# Patient Record
Sex: FEMALE | Race: WHITE | NOT HISPANIC OR LATINO | Employment: UNEMPLOYED | URBAN - METROPOLITAN AREA
[De-identification: names, ages, dates, MRNs, and addresses within clinical notes are randomized per-mention and may not be internally consistent; named-entity substitution may affect disease eponyms.]

---

## 2018-03-12 ENCOUNTER — HOSPITAL ENCOUNTER (OUTPATIENT)
Dept: FAMILY MEDICINE CLINIC | Facility: CLINIC | Age: 58
Setting detail: SPECIMEN
Discharge: HOME | End: 2018-03-12
Attending: FAMILY MEDICINE | Admitting: FAMILY MEDICINE

## 2018-03-15 ENCOUNTER — HOSPITAL ENCOUNTER (OUTPATIENT)
Dept: FAMILY MEDICINE CLINIC | Facility: CLINIC | Age: 58
Setting detail: SPECIMEN
Discharge: HOME | End: 2018-03-15
Attending: FAMILY MEDICINE | Admitting: FAMILY MEDICINE

## 2019-10-01 ENCOUNTER — OFFICE VISIT (OUTPATIENT)
Dept: FAMILY MEDICINE CLINIC | Facility: CLINIC | Age: 59
End: 2019-10-01

## 2019-10-01 VITALS
HEART RATE: 74 BPM | OXYGEN SATURATION: 98 % | WEIGHT: 198 LBS | RESPIRATION RATE: 16 BRPM | SYSTOLIC BLOOD PRESSURE: 149 MMHG | DIASTOLIC BLOOD PRESSURE: 89 MMHG | BODY MASS INDEX: 35.08 KG/M2 | HEIGHT: 63 IN | TEMPERATURE: 97.3 F

## 2019-10-01 DIAGNOSIS — E03.9 HYPOTHYROIDISM, UNSPECIFIED TYPE: ICD-10-CM

## 2019-10-01 DIAGNOSIS — R53.83 FATIGUE, UNSPECIFIED TYPE: ICD-10-CM

## 2019-10-01 DIAGNOSIS — R10.32 LEFT LOWER QUADRANT ABDOMINAL PAIN: Primary | ICD-10-CM

## 2019-10-01 PROBLEM — R19.4 CHANGE IN BOWEL HABITS: Status: ACTIVE | Noted: 2018-03-12

## 2019-10-01 LAB
ALBUMIN SERPL-MCNC: 3.8 G/DL (ref 3.5–4.8)
ALBUMIN/GLOB SERPL: 1.1 G/DL (ref 1–1.7)
ALP SERPL-CCNC: 84 U/L (ref 32–91)
ALT SERPL W P-5'-P-CCNC: 29 U/L (ref 14–54)
ANION GAP SERPL CALCULATED.3IONS-SCNC: 15.6 MMOL/L (ref 5–15)
AST SERPL-CCNC: 24 U/L (ref 15–41)
BASOPHILS # BLD AUTO: 0 10*3/MM3 (ref 0–0.2)
BASOPHILS NFR BLD AUTO: 0.5 % (ref 0–1.5)
BILIRUB SERPL-MCNC: 0.3 MG/DL (ref 0.3–1.2)
BUN BLD-MCNC: 12 MG/DL (ref 8–20)
BUN/CREAT SERPL: 13.3 (ref 5.4–26.2)
CALCIUM SPEC-SCNC: 9.1 MG/DL (ref 8.9–10.3)
CHLORIDE SERPL-SCNC: 104 MMOL/L (ref 101–111)
CO2 SERPL-SCNC: 24 MMOL/L (ref 22–32)
CREAT BLD-MCNC: 0.9 MG/DL (ref 0.4–1)
DEPRECATED RDW RBC AUTO: 43.8 FL (ref 37–54)
EOSINOPHIL # BLD AUTO: 0.1 10*3/MM3 (ref 0–0.4)
EOSINOPHIL NFR BLD AUTO: 1.3 % (ref 0.3–6.2)
ERYTHROCYTE [DISTWIDTH] IN BLOOD BY AUTOMATED COUNT: 13.9 % (ref 12.3–15.4)
GFR SERPL CREATININE-BSD FRML MDRD: 64 ML/MIN/1.73
GLOBULIN UR ELPH-MCNC: 3.5 GM/DL (ref 2.5–3.8)
GLUCOSE BLD-MCNC: 94 MG/DL (ref 65–99)
HCT VFR BLD AUTO: 43.6 % (ref 34–46.6)
HGB BLD-MCNC: 14.7 G/DL (ref 12–15.9)
LYMPHOCYTES # BLD AUTO: 3.5 10*3/MM3 (ref 0.7–3.1)
LYMPHOCYTES NFR BLD AUTO: 32.5 % (ref 19.6–45.3)
MCH RBC QN AUTO: 30.1 PG (ref 26.6–33)
MCHC RBC AUTO-ENTMCNC: 33.6 G/DL (ref 31.5–35.7)
MCV RBC AUTO: 89.5 FL (ref 79–97)
MONOCYTES # BLD AUTO: 0.6 10*3/MM3 (ref 0.1–0.9)
MONOCYTES NFR BLD AUTO: 5.8 % (ref 5–12)
NEUTROPHILS # BLD AUTO: 6.4 10*3/MM3 (ref 1.7–7)
NEUTROPHILS NFR BLD AUTO: 59.9 % (ref 42.7–76)
NRBC BLD AUTO-RTO: 0.1 /100 WBC (ref 0–0.2)
PLATELET # BLD AUTO: 276 10*3/MM3 (ref 140–450)
PMV BLD AUTO: 7.5 FL (ref 6–12)
POTASSIUM BLD-SCNC: 3.6 MMOL/L (ref 3.6–5.1)
PROT SERPL-MCNC: 7.3 G/DL (ref 6.1–7.9)
RBC # BLD AUTO: 4.87 10*6/MM3 (ref 3.77–5.28)
SODIUM BLD-SCNC: 140 MMOL/L (ref 136–144)
TSH SERPL DL<=0.05 MIU/L-ACNC: 4.7 UIU/ML (ref 0.34–5.6)
WBC NRBC COR # BLD: 10.7 10*3/MM3 (ref 3.4–10.8)

## 2019-10-01 PROCEDURE — 85025 COMPLETE CBC W/AUTO DIFF WBC: CPT | Performed by: FAMILY MEDICINE

## 2019-10-01 PROCEDURE — 84443 ASSAY THYROID STIM HORMONE: CPT | Performed by: FAMILY MEDICINE

## 2019-10-01 PROCEDURE — 36415 COLL VENOUS BLD VENIPUNCTURE: CPT | Performed by: FAMILY MEDICINE

## 2019-10-01 PROCEDURE — 99214 OFFICE O/P EST MOD 30 MIN: CPT | Performed by: FAMILY MEDICINE

## 2019-10-01 PROCEDURE — 80053 COMPREHEN METABOLIC PANEL: CPT | Performed by: FAMILY MEDICINE

## 2019-10-01 NOTE — PROGRESS NOTES
Subjective   Chief Complaint   Patient presents with   • Rectal Bleeding     Smiley Sanchez is a 59 y.o. female.     She has been eating out at restaurants more frequently.  She had some pink tinge to her stool yesterday.  Mucus in stool. No diarrhea.  Paper-thin unformed stools. She thinks she had a hemorrhoid recently but she treated it by putting menthol in her belly button and it seems to have improved.  She stopped taking thyroid medicine years ago because she felt like it made her gain 40 lbs.       Rectal Bleeding   This is a new problem. The current episode started yesterday. The problem occurs intermittently. The problem has been gradually worsening. Associated symptoms include abdominal pain, a change in bowel habit and fatigue. Pertinent negatives include no anorexia, chest pain, chills, coughing, fever, nausea, rash, sore throat, swollen glands, urinary symptoms or vomiting. Nothing aggravates the symptoms.   Hypothyroidism   This is a chronic problem. The current episode started more than 1 year ago. The problem has been waxing and waning. Associated symptoms include abdominal pain, a change in bowel habit and fatigue. Pertinent negatives include no anorexia, chest pain, chills, coughing, fever, nausea, rash, sore throat, swollen glands, urinary symptoms or vomiting.      Past Medical History:   Diagnosis Date   • Hypothyroidism    • Vitamin D deficiency      Past Surgical History:   Procedure Laterality Date   • CHOLECYSTECTOMY     • KNEE ARTHROSCOPY       Allergies   Allergen Reactions   • Tetracycline Unknown (See Comments)     Social History     Socioeconomic History   • Marital status: Single     Spouse name: Not on file   • Number of children: Not on file   • Years of education: Not on file   • Highest education level: Not on file   Tobacco Use   • Smoking status: Never Smoker   • Smokeless tobacco: Never Used   Substance and Sexual Activity   • Sexual activity: No     Social History     Tobacco  "Use   Smoking Status Never Smoker   Smokeless Tobacco Never Used       family history includes Diabetes in her father and mother; Hypertension in her father and mother.  No current outpatient medications on file prior to visit.     No current facility-administered medications on file prior to visit.      Patient Active Problem List   Diagnosis   • Change in bowel habits   • Fatigue   • Hypothyroidism   • Left lower quadrant abdominal pain       The following portions of the patient's history were reviewed and updated as appropriate: allergies, current medications, past family history, past medical history, past social history, past surgical history and problem list.    Review of Systems   Constitutional: Positive for fatigue. Negative for chills and fever.   HENT: Negative for sinus pressure, sore throat and swollen glands.    Eyes: Negative for blurred vision.   Respiratory: Negative for cough and shortness of breath.    Cardiovascular: Negative for chest pain and palpitations.   Gastrointestinal: Positive for abdominal pain, change in bowel habit and hematochezia. Negative for anorexia, nausea and vomiting.   Endocrine: Negative for polyuria.   Skin: Negative for rash.   Neurological: Negative for dizziness and headache.   Hematological: Negative for adenopathy.   Psychiatric/Behavioral: Negative for depressed mood.       Objective   /89 (BP Location: Left arm, Patient Position: Sitting, Cuff Size: Adult)   Pulse 74   Temp 97.3 °F (36.3 °C) (Oral)   Resp 16   Ht 160 cm (63\")   Wt 89.8 kg (198 lb)   SpO2 98%   BMI 35.07 kg/m²   Physical Exam   Constitutional: She is oriented to person, place, and time. She appears well-developed. No distress.   HENT:   Head: Normocephalic.   Eyes: Conjunctivae and lids are normal.   Neck: Trachea normal. No thyroid mass and no thyromegaly present.   Cardiovascular: Normal rate, regular rhythm and normal heart sounds.   Pulmonary/Chest: Effort normal and breath sounds " normal.   Abdominal: Soft. Normal appearance. There is tenderness in the left lower quadrant. There is no rebound and no guarding.   Lymphadenopathy:     She has no cervical adenopathy.   Neurological: She is alert and oriented to person, place, and time.   Skin: Skin is warm and dry.   Psychiatric: She has a normal mood and affect. Her speech is normal and behavior is normal. She is attentive.       No visits with results within 1 Week(s) from this visit.   Latest known visit with results is:   No results found for any previous visit.           Assessment/Plan   Problems Addressed this Visit        Endocrine    Hypothyroidism     Off of her medicine for a long time.  Check labs.         Relevant Orders    Comprehensive Metabolic Panel    TSH       Nervous and Auditory    Left lower quadrant abdominal pain - Primary     Rule out diverticulitis.  Pt appears more tender on exam than expected. Check labs as ordered.         Relevant Orders    CT Abdomen Pelvis With Contrast    CBC & Differential    Comprehensive Metabolic Panel    CBC Auto Differential       Other    Fatigue          Smiley was seen today for rectal bleeding.    Diagnoses and all orders for this visit:    Left lower quadrant abdominal pain  -     CT Abdomen Pelvis With Contrast; Future  -     CBC & Differential  -     Comprehensive Metabolic Panel  -     CBC Auto Differential    Hypothyroidism, unspecified type  -     Comprehensive Metabolic Panel  -     TSH    Fatigue, unspecified type

## 2019-10-01 NOTE — PATIENT INSTRUCTIONS
Hypothyroidism    Hypothyroidism is when the thyroid gland does not make enough of certain hormones (it is underactive). The thyroid gland is a small gland located in the lower front part of the neck, just in front of the windpipe (trachea). This gland makes hormones that help control how the body uses food for energy (metabolism) as well as how the heart and brain function. These hormones also play a role in keeping your bones strong. When the thyroid is underactive, it produces too little of the hormones thyroxine (T4) and triiodothyronine (T3).  What are the causes?  This condition may be caused by:  · Hashimoto's disease. This is a disease in which the body's disease-fighting system (immune system) attacks the thyroid gland. This is the most common cause.  · Viral infections.  · Pregnancy.  · Certain medicines.  · Birth defects.  · Past radiation treatments to the head or neck for cancer.  · Past treatment with radioactive iodine.  · Past exposure to radiation in the environment.  · Past surgical removal of part or all of the thyroid.  · Problems with a gland in the center of the brain (pituitary gland).  · Lack of enough iodine in the diet.  What increases the risk?  You are more likely to develop this condition if:  · You are female.  · You have a family history of thyroid conditions.  · You use a medicine called lithium.  · You take medicines that affect the immune system (immunosuppressants).  What are the signs or symptoms?  Symptoms of this condition include:  · Feeling as though you have no energy (lethargy).  · Not being able to tolerate cold.  · Weight gain that is not explained by a change in diet or exercise habits.  · Lack of appetite.  · Dry skin.  · Coarse hair.  · Menstrual irregularity.  · Slowing of thought processes.  · Constipation.  · Sadness or depression.  How is this diagnosed?  This condition may be diagnosed based on:  · Your symptoms, your medical history, and a physical exam.  · Blood  tests.  You may also have imaging tests, such as an ultrasound or MRI.  How is this treated?  This condition is treated with medicine that replaces the thyroid hormones that your body does not make. After you begin treatment, it may take several weeks for symptoms to go away.  Follow these instructions at home:  · Take over-the-counter and prescription medicines only as told by your health care provider.  · If you start taking any new medicines, tell your health care provider.  · Keep all follow-up visits as told by your health care provider. This is important.  ? As your condition improves, your dosage of thyroid hormone medicine may change.  ? You will need to have blood tests regularly so that your health care provider can monitor your condition.  Contact a health care provider if:  · Your symptoms do not get better with treatment.  · You are taking thyroid replacement medicine and you:  ? Sweat a lot.  ? Have tremors.  ? Feel anxious.  ? Lose weight rapidly.  ? Cannot tolerate heat.  ? Have emotional swings.  ? Have diarrhea.  ? Feel weak.  Get help right away if you have:  · Chest pain.  · An irregular heartbeat.  · A rapid heartbeat.  · Difficulty breathing.  Summary  · Hypothyroidism is when the thyroid gland does not make enough of certain hormones (it is underactive).  · When the thyroid is underactive, it produces too little of the hormones thyroxine (T4) and triiodothyronine (T3).  · The most common cause is Hashimoto's disease, a disease in which the body's disease-fighting system (immune system) attacks the thyroid gland. The condition can also be caused by viral infections, medicine, pregnancy, or past radiation treatment to the head or neck.  · Symptoms may include weight gain, dry skin, constipation, feeling as though you do not have energy, and not being able to tolerate cold.  · This condition is treated with medicine to replace the thyroid hormones that your body does not make.  This information  is not intended to replace advice given to you by your health care provider. Make sure you discuss any questions you have with your health care provider.  Document Released: 12/18/2006 Document Revised: 11/28/2018 Document Reviewed: 11/28/2018  [employer] Interactive Patient Education © 2019 [employer]

## 2019-10-04 RX ORDER — AMOXICILLIN AND CLAVULANATE POTASSIUM 875; 125 MG/1; MG/1
1 TABLET, FILM COATED ORAL 2 TIMES DAILY
Qty: 20 TABLET | Refills: 0 | Status: SHIPPED | OUTPATIENT
Start: 2019-10-04 | End: 2022-07-25

## 2021-07-09 ENCOUNTER — HOSPITAL ENCOUNTER (EMERGENCY)
Facility: HOSPITAL | Age: 61
Discharge: HOME | End: 2021-07-09
Attending: EMERGENCY MEDICINE | Admitting: EMERGENCY MEDICINE

## 2021-07-09 VITALS
HEART RATE: 81 BPM | OXYGEN SATURATION: 99 % | SYSTOLIC BLOOD PRESSURE: 156 MMHG | HEIGHT: 63 IN | DIASTOLIC BLOOD PRESSURE: 88 MMHG | BODY MASS INDEX: 38.09 KG/M2 | TEMPERATURE: 97.9 F | WEIGHT: 215 LBS | RESPIRATION RATE: 18 BRPM

## 2021-07-09 DIAGNOSIS — Z77.29 CARBON MONOXIDE EXPOSURE: Primary | ICD-10-CM

## 2021-07-09 LAB — COHGB MFR BLD: 4.6 % (ref 0–3)

## 2021-07-09 PROCEDURE — 82375 ASSAY CARBOXYHB QUANT: CPT | Performed by: EMERGENCY MEDICINE

## 2021-07-09 PROCEDURE — 99283 EMERGENCY DEPT VISIT LOW MDM: CPT
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
## 2022-07-25 ENCOUNTER — OFFICE VISIT (OUTPATIENT)
Dept: FAMILY MEDICINE CLINIC | Facility: CLINIC | Age: 62
End: 2022-07-25

## 2022-07-25 VITALS
SYSTOLIC BLOOD PRESSURE: 132 MMHG | DIASTOLIC BLOOD PRESSURE: 89 MMHG | HEART RATE: 67 BPM | TEMPERATURE: 98.2 F | BODY MASS INDEX: 39.15 KG/M2 | OXYGEN SATURATION: 98 % | WEIGHT: 221 LBS

## 2022-07-25 DIAGNOSIS — M25.571 RIGHT ANKLE PAIN, UNSPECIFIED CHRONICITY: Primary | ICD-10-CM

## 2022-07-25 PROCEDURE — 99213 OFFICE O/P EST LOW 20 MIN: CPT | Performed by: FAMILY MEDICINE

## 2022-07-28 ENCOUNTER — TELEPHONE (OUTPATIENT)
Dept: FAMILY MEDICINE CLINIC | Facility: CLINIC | Age: 62
End: 2022-07-28

## 2022-07-28 NOTE — TELEPHONE ENCOUNTER
Patient returned call for xray results, results were given.  Patient stated understanding and will follow up with PT has ordered.     Thanks

## 2022-08-22 ENCOUNTER — TREATMENT (OUTPATIENT)
Dept: PHYSICAL THERAPY | Facility: CLINIC | Age: 62
End: 2022-08-22

## 2022-08-22 DIAGNOSIS — M25.571 RIGHT ANKLE PAIN, UNSPECIFIED CHRONICITY: Primary | ICD-10-CM

## 2022-08-22 PROCEDURE — 97161 PT EVAL LOW COMPLEX 20 MIN: CPT | Performed by: PHYSICAL THERAPIST

## 2022-08-22 NOTE — PROGRESS NOTES
Physical Therapy Initial Evaluation and Plan of Care    Patient: Smiley Sanchez   : 1960  Diagnosis/ICD-10 Code:  Right ankle pain, unspecified chronicity [M25.571]  Referring practitioner: Mamie Gongora MD  Date of Initial Visit: 2022  Today's Date: 2022  Patient seen for 1 sessions           Subjective Questionnaire:FAAM: 45/68 ( 4 omitted) 66% function.   Self reported function 75-85%      Subjective  Pt with onset of R posterolateral ankle pain, behind lateral malleolus insidious onset ~ 6-8 weeks ago. She reports sprained ankle about 5 years ago, healed no issues. She first noticed it walking into restaurant, sharp, burning, pulling. Caused her to limp. She had been doing treadmill with incline and elliptical at the gym. She had to decrease incline and now mostly just going elliptical but shorter duration. She notes worse when she initially stands up after sitting. Likes to write; sits with laptop on futon with legs extended on ottoman, often hours. Always painful,a  Lot of pulling initially when she gets up. Better with movement but then worse again if too much activity. Standing to cook meal aggravates, will often weight shift off R LE. Sometimes notices with going down stairs; especially desc very steep steps at [facility] when she went to see concert; had to modify to step to pattern. Able to do her ADLs and shopping; sometimes has pain, sometimes does not. She has been less active lately, tries to stay off her feet. She notes better with ice, rest, and leg press exercise at gym.  She goes barefoot at home, wears tennis shoes at gym. Flat feet. Pt reports mildly claustrophobic. She reports has gained weight, mostly muscle.    Saw PCP, xray negative. Follow up as needed.    Currently unemployed, previously employed in education    0/10 to 5/10    Objective          Observations     Additional Ankle/Foot Observation Details  Sit to stand unremarkable    Standing genu valgus, flat foot,  "R >L.    Ambulation: no significant antalgia, no AD. Mild scissoring. Navicular drop in stance bilat.    Calcaneal inversion R>L, forefoot varus (compensates with WB)            Palpation     Right Tenderness of the lateral gastrocnemius.     Tenderness     Right Ankle/Foot   Tenderness in the Achilles insertion and lateral malleolus.     Neurological Testing     Sensation     Ankle/Foot   Left Ankle/Foot   Intact: light touch    Right Ankle/Foot   Intact: light touch     Active Range of Motion     Additional Active Range of Motion Details  Seated hip ER: unremarkable bilat    Passive Range of Motion   Left Ankle/Foot    Inversion: 45 degrees   Eversion: 8 degrees     Right Ankle/Foot    Plantar flexion: WFL  Inversion: 42 degrees   Eversion: 10 degrees     Additional Passive Range of Motion Details  DF ROM:   R ke 0, kf 0 pulling calf  L ke -1, kf 4 pulling calf    AROM DF \"pulling\" posterior calf,  \"mild pulling\" lateral edd, PF and inv unremarkable    Joint Play     Right Ankle/Foot  Joints within functional limits are the forefoot. Hypomobile in the talocrural joint.     Strength/Myotome Testing     Additional Strength Details  Strength 4+/5 sitting throughout LE, all painfree except ankle. Mild discomfort with eversion. PF/DF, inv/ev all 4/5. Standing heel raise not tested this date.     Tests     Right Ankle/Foot   Negative for calcaneal squeeze and eversion talar tilt.      General Comments     Ankle/Foot Comments   Mild edema posterolateral R ankle behind lateral malleolus.    Single leg balance good ~ 10 sec bilat with navicular drop, no significant sway EO.         Assessment & Plan     Assessment  Impairments: abnormal gait, abnormal or restricted ROM, activity intolerance, impaired balance, impaired physical strength, lacks appropriate home exercise program, pain with function and weight-bearing intolerance  Functional Limitations: lifting, walking, pulling, pushing, uncomfortable because of pain, " "standing and stooping  Assessment details: Pt with mild ROM, flexibility, strength, and gait impairments. Mild edema and intermiitent pain. Foot structure and unsupportive shoewear also likely contribute; pain is reproduced in WB. She is able to do ADLs, but intermittent pain and modification especially shopping and cooking. She is not able to participate in gym exercise as she would like/did previously. She would benefit from skilled PT to address impairments and maximize function including return to PLOF.    Barriers to therapy: BMI 39.5. Pt requires frequent redirection to stay on task, pt reports flustered due to construction/car issues on drive to clinic  Prognosis: good    Goals  Plan Goals: ST.  Pt independent with HEP in 2 visits  2.  Pt to demonstrate ankle DF 9ke and kf) AROM to 5 degrees or better in 3 weeks  3. Decreased edema R ankle in 3 weeks  4. Pt to demonstrate AROM ankle ev 15 degrees painfree.  5. Pt to tolerate HEP exercise progression in 4 weeks  6. Pt to ambulate 50 ft without antalgic pattern and demonstrate improved push off with regular shoe and brace in 4 weeks   7. Pt to report pain decreased 50% or more with initial standing after sitting.    LTG  1. Pt to demonstrate R ankle 4=/5 or better all directions by D/C for squat, stairs.  2. Pt to demonstrate ankle DF 8 degrees or better (ke and kf)  By D/C needed for amb and desc stairs.  3. Pt to desc 8\" steps with recirprocal pattern by D/C  4. Pt to return to walking 1 mile with incline with proper shoewear and no increased R ankle symptoms by D/C  5. FAAm score improved to 90% or better for increased functional tolerance  6. Pt to stand 20 mins to cook meal without increased R ankle pain by D/C  7. Pt to voice readiness for D/C to independent HEP and self management of symptoms.     Plan  Therapy options: will be seen for skilled therapy services  Planned modality interventions: ultrasound, thermotherapy (hydrocollator packs), dry " needling, cryotherapy and electrical stimulation/Russian stimulation  Planned therapy interventions: balance/weight-bearing training, body mechanics training, flexibility, functional ROM exercises, gait training, home exercise program, joint mobilization, manual therapy, neuromuscular re-education, postural training, soft tissue mobilization, spinal/joint mobilization, strengthening, therapeutic activities and stretching  Frequency: 1x week  Duration in visits: 8  Duration in weeks: 12  Treatment plan discussed with: patient  Plan details: 1x/1-2 weeks          History # of Personal Factors and/or Comorbidities: MODERATE (1-2)  Examination of Body System(s): # of elements: LOW (1-2)  Clinical Presentation: STABLE   Clinical Decision Making: LOW     Timed:         Manual Therapy:         mins  33537;     Therapeutic Exercise:    15     mins  15659 (not charged)  Neuromuscular Ivan:        mins  58123;    Therapeutic Activity:          mins  17288;     Gait Training:           mins  45091;     Ultrasound:          mins  88824;    Ionto                                   mins   39539  Self Care                            mins   04604  Aquatic                               mins 76882      Un-Timed:  Electrical Stimulation:         mins  49279 ( );  Dry Needling          mins self-pay  Traction          mins 91995  Low Eval     35     Mins  98156  Mod Eval          Mins  94797  High Eval                            Mins  32132  Re-Eval                               mins  32207        Timed Treatment:   15   mins   Total Treatment:     50   mins    PT SIGNATURE: Gracia Lane PT   DATE TREATMENT INITIATED: 8/23/2022    Initial Certification  Certification Period: 11/21/2022  I certify that the therapy services are furnished while this patient is under my care.  The services outlined above are required by this patient, and will be reviewed every 90 days.     PHYSICIAN: Mamie Gongora MD      DATE:     Please sign  and return via fax to [phone].. Thank you, [facility].

## 2022-09-09 ENCOUNTER — TREATMENT (OUTPATIENT)
Dept: PHYSICAL THERAPY | Facility: CLINIC | Age: 62
End: 2022-09-09

## 2022-09-09 DIAGNOSIS — M25.571 RIGHT ANKLE PAIN, UNSPECIFIED CHRONICITY: Primary | ICD-10-CM

## 2022-09-09 PROCEDURE — 97140 MANUAL THERAPY 1/> REGIONS: CPT | Performed by: PHYSICAL THERAPIST

## 2022-09-09 PROCEDURE — 97110 THERAPEUTIC EXERCISES: CPT | Performed by: PHYSICAL THERAPIST

## 2022-09-09 NOTE — PROGRESS NOTES
Physical Therapy Treatment Note    VISIT#: 2     Diagnosis Plan   1. Right ankle pain, unspecified chronicity       Subjective   Smiley Sanchez reports: doing HEP and some yoga. Still hurts/pulls end range eversion and sometimes with circles otherwise. She has also been doing some gastroc stretches and resisted PF with green theraband. Did some research on shoe inserts to address flat feet, has not got them yet. Also going to get new tennis shoes.     Objective     See Exercise, Manual, and Modality Logs for complete treatment.     Patient Education:    Access Code: [email or web address]  URL: [email or web address]  Date: 09/09/2022  Prepared by: Gracia Lane    Exercises  Gastroc Stretch on Wall - 2 x daily - 7 x weekly - 3 sets - 10 reps  Soleus Stretch on Wall - 2 x daily - 7 x weekly - 3 sets - 10 reps  Seated Ankle Circles - 2 x daily - 7 x weekly - 2 sets - 10 reps  Seated Ankle Dorsiflexion AROM - 2 x daily - 7 x weekly - 2 sets - 10 reps  Seated Ankle Inversion Eversion AROM - 1 x daily - 7 x weekly - 2 sets - 10 reps  Seated Ankle Inversion with Resistance and Legs Crossed - 4 x weekly - 2 sets - 10 reps  Ankle Dorsiflexion with Resistance - 4 x weekly - 2 sets - 10 reps  Ankle Plantar Flexion with Resistance - 4 x weekly - 2 sets - 10 reps    Reviewed HEP avoid knee hyperextension.  Modified wall stretch due to L knee.    Pt reports does not need ice today.    Assessment/Plan Pt with mild discomfort active eversion, not PROM. Held eversion resisted motion as result, will consider adding next visit. Good tolerance to manual. Capsular restriction creep noted.     Progress per Plan of Care Monitor and progress as tolerated.            Timed:         Manual Therapy:    15     mins  19825;     Therapeutic Exercise:    30     mins  12854;     Neuromuscular Ivan:        mins  03574;    Therapeutic Activity:          mins  19754;     Gait Training:           mins  32112;     Ultrasound:          mins  07116;    Ionto                                    mins   25581  Self Care                            mins   19527  Canalith Repos                   mins  4209  Aquatic                               mins 90926    Un-Timed:  Electrical Stimulation:         mins  68841 ( );  Dry Needling          mins self-pay  Traction          mins 62220  Low Eval          Mins  05223  Mod Eval         Mins  13530  High Eval                            Mins  43380  Re-Eval                               mins  83285    Timed Treatment:   45   mins   Total Treatment:     45   mins    Gracia Lane, PT

## 2022-09-14 ENCOUNTER — TREATMENT (OUTPATIENT)
Dept: PHYSICAL THERAPY | Facility: CLINIC | Age: 62
End: 2022-09-14

## 2022-09-14 DIAGNOSIS — M25.571 RIGHT ANKLE PAIN, UNSPECIFIED CHRONICITY: Primary | ICD-10-CM

## 2022-09-14 PROCEDURE — 97140 MANUAL THERAPY 1/> REGIONS: CPT | Performed by: PHYSICAL THERAPIST

## 2022-09-14 PROCEDURE — 97110 THERAPEUTIC EXERCISES: CPT | Performed by: PHYSICAL THERAPIST

## 2022-09-14 NOTE — PROGRESS NOTES
"Physical Therapy Treatment Note    VISIT#: 3     Diagnosis Plan   1. Right ankle pain, unspecified chronicity       Subjective   Smiley Sanchez reports: yoga and exercises really helping.  She feels that therapy is helping. She got good feet inserts yesterday. Tried to do some walking laps at the gym (~80 steps in 1 lap), still \"pulled\" after 1 lap and had to stop. Did not have inserts yet. Not doing treadmill.      Objective     See Exercise, Manual, and Modality Logs for complete treatment.     Pt arrived wearing slipper socks today. Mild edema posterolateral ankle.     Exercises well tolerated except for resisted soleus PF. Pain posterior/posterolateral ankle. Deferred. AROM painfree. Prone soleus PF against gravity tolerated    Good tolerance to manual therapy.     Assessment/Plan Posterolateral impingement. Present. Hypomobility hindfoot.   Progress per Plan of Care Monitor response to shoe inserts            Timed:         Manual Therapy:    17     mins  50499;     Therapeutic Exercise:    28     mins  00884;     Neuromuscular Ivan:        mins  95541;    Therapeutic Activity:          mins  31304;     Gait Training:           mins  80553;     Ultrasound:          mins  90065;    Ionto                                   mins   03058  Self Care                            mins   28918  Canalith Repos                   mins  4209  Aquatic                               mins 41566    Un-Timed:  Electrical Stimulation:         mins  83101 ( );  Dry Needling         mins self-pay  Traction          mins 71422  Low Eval          Mins  97076  Mod Eval          Mins  09872  High Eval                            Mins  21976  Re-Eval                               mins  48680    Timed Treatment:   45   mins   Total Treatment:     45   mins    Gracia Lane, PT  "

## 2022-10-03 ENCOUNTER — TREATMENT (OUTPATIENT)
Dept: PHYSICAL THERAPY | Facility: CLINIC | Age: 62
End: 2022-10-03

## 2022-10-03 DIAGNOSIS — M25.571 RIGHT ANKLE PAIN, UNSPECIFIED CHRONICITY: Primary | ICD-10-CM

## 2022-10-03 PROCEDURE — 97035 APP MDLTY 1+ULTRASOUND EA 15: CPT | Performed by: PHYSICAL THERAPIST

## 2022-10-03 PROCEDURE — 97110 THERAPEUTIC EXERCISES: CPT | Performed by: PHYSICAL THERAPIST

## 2022-10-03 PROCEDURE — 97140 MANUAL THERAPY 1/> REGIONS: CPT | Performed by: PHYSICAL THERAPIST

## 2022-10-03 NOTE — PROGRESS NOTES
Physical Therapy Treatment Note    VISIT#: 4     Diagnosis Plan   1. Right ankle pain, unspecified chronicity       Subjective   Smiley Sanchez reports: Did well for a few days after last visit, been 2.5 weeks. Walking 45 mins [employer] on concrete, made her limp and walk on outside of her foot, bothered her a lot the next day. Been more swollen and stiff; L is also some mild edema. But frustrated that she cant walk at the gym, does not like seated eliptical. Has not been doing to the gym. She tried a pair of shoes from [employer] - do not fit well due to high instep, going to return them. She has not been wearing her arch supports. Lateral ankle flaired up, pulling, intermittent pinching lateral heel. No issues with HEP exercises. Mostly just an issue during WB. Hurt when turned on foot wrong while standing doing work in kitchen, made her yell out.    Objective     See Exercise, Manual, and Modality Logs for complete treatment.     Patient Education: AROM only today. Pt reports theraband exercises all fine at home, only one painful was soleus/PF in knee flexed position that she tried in clinic last vist.    Pt with seat closer with biking today, discomfort posterior heel.    Hindfoot hypomobility. Significant cancaneal inversion R.   Edema lateral ankle, anteromedial ankle. Generalized edema mild bilat ankles.    Kinesiotape R arch. Did not stick with attempt to tape for posterior tib/PF.    Assessment/Plan Better gasroc/soleus stretch with subtalar neutral correction today. Good tolerance to exercises with decreased intensity. Also good tolerance to manual. Good initial response to US to decrease inflammation.     Progress per Plan of Care Continue to work on finding appropriate shoewear to minimize posterior impingement, consider compression socks to decrease edema and stiffness             Timed:         Manual Therapy:    20     mins  20221;     Therapeutic Exercise:     22    mins  62118;     Neuromuscular Ivan:         mins  90118;    Therapeutic Activity:          mins  20417;     Gait Training:           mins  94352;     Ultrasound:     8     mins  20283;    Ionto                                   mins   64139  Self Care                            mins   76917  Canalith Repos                   mins  4209  Aquatic                               mins 23958    Un-Timed:  Electrical Stimulation:         mins  63661 ( );  Dry Needling          mins self-pay  Traction          mins 97540  Low Eval          Mins  95888  Mod Eval          Mins  88001  High Eval                            Mins  91205  Re-Eval                               mins  75287    Timed Treatment:   50   mins   Total Treatment:     50   mins    Gracia Lane PT

## 2022-10-10 ENCOUNTER — TREATMENT (OUTPATIENT)
Dept: PHYSICAL THERAPY | Facility: CLINIC | Age: 62
End: 2022-10-10

## 2022-10-10 DIAGNOSIS — M25.571 RIGHT ANKLE PAIN, UNSPECIFIED CHRONICITY: Primary | ICD-10-CM

## 2022-10-10 PROCEDURE — 97110 THERAPEUTIC EXERCISES: CPT | Performed by: PHYSICAL THERAPIST

## 2022-10-10 PROCEDURE — 97140 MANUAL THERAPY 1/> REGIONS: CPT | Performed by: PHYSICAL THERAPIST

## 2022-10-10 NOTE — PROGRESS NOTES
Physical Therapy Treatment Note    VISIT#: 5     Diagnosis Plan   1. Right ankle pain, unspecified chronicity          Subjective   Smiley Sanchez reports: walked on grass uneven ground, did a lot better than trying to walk in the gym. Doing better than when she started, feels more movement. Still gets pain but less often. Not sure if tape helped or not, until started to fall off, then she could tell it was beneficial.    Objective     See Exercise, Manual, and Modality Logs for complete treatment.     Patient Education: Pt wearing slipper socks today. Discussed proper shoewear with arch support. She reports going to get new shoes this week.    Bike without increased pain with seat back.     Pt reports slant stretches better tolerated than wall stretch, good stretch.    Assessment/Plan mild anterior ankle impingement with soleus stretch today. Less posterior impingement today. Continued manual with good tolerance.     Progress per Plan of Care Insurance auth next visit, provided FAAM for pt to bring next visit. Resume US as needed.          Timed:         Manual Therapy:    25     mins  38738;     Therapeutic Exercise:    20     mins  12984;     Neuromuscular Ivan:        mins  02169;    Therapeutic Activity:          mins  98700;     Gait Training:           mins  79809;     Ultrasound:          mins  97802;    Ionto                                   mins   33763  Self Care                            mins   47924  Canalith Repos                   mins  4209  Aquatic                               mins 32630    Un-Timed:  Electrical Stimulation:         mins  96822 ( );  Dry Needling          mins self-pay  Traction          mins 69888  Low Eval          Mins  73669  Mod Eval          Mins  22984  High Eval                            Mins  08435  Re-Eval                               mins  15856    Timed Treatment:   45   mins   Total Treatment:     45   mins    Gracia Lane, PT

## 2022-10-17 ENCOUNTER — TREATMENT (OUTPATIENT)
Dept: PHYSICAL THERAPY | Facility: CLINIC | Age: 62
End: 2022-10-17

## 2022-10-17 DIAGNOSIS — M25.571 RIGHT ANKLE PAIN, UNSPECIFIED CHRONICITY: Primary | ICD-10-CM

## 2022-10-17 PROCEDURE — 97110 THERAPEUTIC EXERCISES: CPT | Performed by: PHYSICAL THERAPIST

## 2022-10-17 PROCEDURE — 97140 MANUAL THERAPY 1/> REGIONS: CPT | Performed by: PHYSICAL THERAPIST

## 2022-10-17 NOTE — PROGRESS NOTES
"Physical Therapy Progress Note    VISIT#: 6     Diagnosis Plan   1. Right ankle pain, unspecified chronicity          Subjective   Smiley Sanchez reports: less pain posterior calf, less posterior ankle impingement. Intermittent anterior impingement with going down stairs. Uses step to pattern. She decided to keep her new shoes, been wearing them without smart feet inserts (medium). Walking around with new shoes is helpful, can walk through the house a lot easier, getting up/down from chair a lot easier. She reports \"pain used to dominate everything and kept me from doing a lot of things out of fear that it would hurt, now is secondary and hurts intermittently.\" Walking on concrete, going down stairs normally are the main thing now. Best with walking in the soft grass. Since doing exercises and therapy and change in shoes, she reports 98% better medial and lateral ankle, now just at anterior TC joint. Has not tried compression socks. Does not need taping today.    Objective     FAAM: 66/84 = 79 % (66% at eval)    See Exercise, Manual, and Modality Logs for complete treatment.     Patient Education: reviewed \"up with good and down with bad\" pattern for stairs currently.     R Ankle ROM:   DF ke 10  DF kf 7    Inv 42 cramp medially   Ev 10  PF WFL pull laterally    Strength is 4+/5 sitting, all painfree, standing heel raise not tested.     Functional hip ER: independent with difficulty tying shoes due to muscle length.    13\" single leg balance R on 3rd attempt, mild navicular drop. Calcaneal inversion R>L.    Foot structure: forefoot varus, calcaneal inversion.     Ambulation without antalgia, calcaneal inversion, mild navicular drop, genu valgus, mild scissoring gait. Good push off.     Mild edema posterolateral R ankle behind lateral malleolus.    Assessment/Plan  Pt reports realized she was trying to wear inserts on top of her shoe inserts. Pt is making improvement in ankle/foot accessory motion, flexibility, mild " "increase in ROM. She has strong painfree strength testing, still some functional weakness. She demonstrates significant improvement in gait pattern and tolerance with change in shoewear. Still with intermittent anterior ankle impingement, hypomobility at TC joint. Some relief with TC distraction. Good tolerance to treatment today. She would benefit from continued skilled PT to address remaining impairments and help her return to gym exercise/PLOF.    Plan Goals: ST.  Pt independent with HEP in 2 visits - MET  2.  Pt to demonstrate ankle DF 9ke and kf) AROM to 5 degrees or better in 3 weeks - MET  3. Decreased edema R ankle in 3 weeks - PARTIALLY MET  4. Pt to demonstrate AROM ankle ev 15 degrees painfree. - PARTIALLY MET  5. Pt to tolerate HEP exercise progression in 4 weeks - MET  6. Pt to ambulate 50 ft without antalgic pattern and demonstrate improved push off with regular shoe and brace in 4 weeks - MET  7. Pt to report pain decreased 50% or more with initial standing after sitting. - MET    LTG  1. Pt to demonstrate R ankle 4=/5 or better all directions by D/C for squat, stairs. - PARTIALLY MET  2. Pt to demonstrate ankle DF 8 degrees or better (ke and kf)  By D/C needed for amb and desc stairs.- PARTIALLY MET  3. Pt to desc 8\" steps with recirprocal pattern by D/C - NOT MET  4. Pt to return to walking 1 mile with incline with proper shoewear and no increased R ankle symptoms by D/C - NOT MET  5. FAAm score improved to 90% or better for increased functional tolerance - PARTIALLY MET  6. Pt to stand 20 mins to cook meal without increased R ankle pain by D/C- MET  7. Pt to voice readiness for D/C to independent HEP and self management of symptoms- NOT MET    Progress per Plan of Care Awaiting insurance auth. Continue PT, 2 remaining visits in POC.            Timed:         Manual Therapy:     30    mins  35546;     Therapeutic Exercise:    15     mins  92274;     Neuromuscular Ivan:        mins  93472;  "   Therapeutic Activity:          mins  09152;     Gait Training:           mins  40562;     Ultrasound:          mins  36482;    Ionto                                   mins   14516  Self Care                            mins   54979  Canalith Repos                   mins  4209  Aquatic                               mins 56368    Un-Timed:  Electrical Stimulation:         mins  40286 ( );  Dry Needling          mins self-pay  Traction          mins 09685  Low Eval          Mins  99022  Mod Eval          Mins  67106  High Eval                            Mins  30805  Re-Eval                               mins  10768    Timed Treatment:   45   mins   Total Treatment:     45   mins    Gracia Lane PT

## 2022-11-08 ENCOUNTER — TREATMENT (OUTPATIENT)
Dept: PHYSICAL THERAPY | Facility: CLINIC | Age: 62
End: 2022-11-08

## 2022-11-08 DIAGNOSIS — M25.571 RIGHT ANKLE PAIN, UNSPECIFIED CHRONICITY: Primary | ICD-10-CM

## 2022-11-08 PROCEDURE — 97110 THERAPEUTIC EXERCISES: CPT | Performed by: PHYSICAL THERAPIST

## 2022-11-08 PROCEDURE — 97140 MANUAL THERAPY 1/> REGIONS: CPT | Performed by: PHYSICAL THERAPIST

## 2022-11-08 NOTE — PROGRESS NOTES
Physical Therapy Treatment Note    [address]   [place] [address]  [phone] (p)  [phone] (f)    VISIT#: 7     Diagnosis Plan   1. Right ankle pain, unspecified chronicity          Subjective   Smiley Sanchez reports: doing better with walking on asphalt with new shoes. Doing exercises regularly except for soleus stretch; they help. She feels she is headed in the right direction. Still feels anterior and posterolateral discomfort on occasion, stops before pain. Less often and less intense. Swelling is better now that she is walking more. Wants to lose weight she has gained since being less active.    Objective     See Exercise, Manual, and Modality Logs for complete treatment.     Ambulation good stride length and heel-toe pattern. Able to walk 400 ft in counterclockwise direction. Only able to walk 80 ft with posterolateral R ankle discomfort with walking clockwise direction loop around gym.     Assessment/Plan capsular creep with ankle inversion stretching. Improving ROM and accessory motion foot/ankle. Good tolerance to manual therapy. Tightness and fascia restriction achilles and calf musculature.       Progress per Plan of Care Recert next visit.            Timed:         Manual Therapy:    30     mins  41119;     Therapeutic Exercise:    10     mins  40880;     Neuromuscular Ivan:        mins  14444;    Therapeutic Activity:          mins  32108;     Gait Trainin     mins  70612;     Ultrasound:          mins  17394;    Ionto                                   mins   79368  Self Care                            mins   31532  Canalith Repos                   mins  4209  Aquatic                               mins 48276    Un-Timed:  Electrical Stimulation:         mins  79568 ( );  Dry Needling          mins self-pay  Traction          mins 08060  Low Eval          Mins  89564  Mod Eval          Mins  61177  High Eval                            Mins  20988  Re-Eval                                mins  11334    Timed Treatment:   45   mins   Total Treatment:     45   mins    Gracia Lane PT, DPT  [place]: [id number]

## 2022-11-15 ENCOUNTER — TREATMENT (OUTPATIENT)
Dept: PHYSICAL THERAPY | Facility: CLINIC | Age: 62
End: 2022-11-15

## 2022-11-15 DIAGNOSIS — M25.571 RIGHT ANKLE PAIN, UNSPECIFIED CHRONICITY: Primary | ICD-10-CM

## 2022-11-15 PROCEDURE — 97140 MANUAL THERAPY 1/> REGIONS: CPT | Performed by: PHYSICAL THERAPIST

## 2022-11-15 PROCEDURE — 97110 THERAPEUTIC EXERCISES: CPT | Performed by: PHYSICAL THERAPIST

## 2022-11-15 NOTE — PROGRESS NOTES
Re-Assessment / Re-Certification      [address] [facility] [place] [address]  [phone] (p)  [phone] (f)    Patient: Smiley Sanchez   : 1960  Diagnosis/ICD-10 Code:  Right ankle pain, unspecified chronicity [M25.571]  Referring practitioner: Mamie Gongora MD  Date of Initial Visit: Type: THERAPY  Noted: 2022  Today's Date: 11/15/2022  Patient seen for 8 sessions      Subjective:   Smiley Sanchez reports: Pt reports ~95% better. Less pulling posterior ankle occasional stiffness anterior ankle and medial ankle, occasional top of foot and occasionally posterolateral ankle.  Has been doing a little bit of walking for exercise, but doesn't wear her new shoes because doesn't want to ruin them. Still hurts intermittently during the day, turning etc. afraid it will start hurting as bad as it used to so limits some of her activity. She has not yet returned to gym exercise. Wants to lose weight she has gained since being less active. Therapy has been more beneficial than she thought it would be. Would like to continue. Is doing HEP consistently. Got compression socks but has not worn yet.  Subjective Questionnaire: FAAM: 69/80 = 86% function, 95% self score  Clinical Progress: improved  Home Program Compliance: Yes  Treatment has included: therapeutic exercise, manual therapy, therapeutic activity, ultrasound, dry needling, moist heat and cryotherapy    Subjective   Objective     R Ankle PROM:   DF ke 10  DF kf 8  Inv 50   Ev 18  PF WFL stiff hindfoot    forefoot varus, calcaneal inversion. Collapsed midfoot.    Pt with over supinated foot at rest, overpronates with WB/walking.    Hypomobility hindfoot, first ray, TC joint     Strength is 4+/5 sitting, all painfree, standing heel raise not tested.      Functional hip ER: independent with difficulty tying shoes due to muscle length.     Mild edema posterior achilles.    Single leg balance not tested this date    Stairs not tested this  "date  Assessment/Plan Pt still with intermittent impingment and tendiosis symptoms. Pt with mildly more irritable symptoms today compared to previous visit but still tolerated PROM and mobs well. Recommend continued skilled PT to address remaining accessory motion deficits and return to PLOF including gym exercise. Continue shoreware to accommodate for foot structure.    Progress toward previous goals: Partially Met      Recommendations: Continue as planned, extend POC 5 additional visits. Recommend pt wear her shoes for better support with exercise/walking.     Plan Goals: ST.  Pt independent with HEP in 2 visits - MET  2.  Pt to demonstrate ankle DF 9ke and kf) AROM to 5 degrees or better in 3 weeks - MET  3. Decreased edema R ankle in 3 weeks - MET  4. Pt to demonstrate AROM ankle ev 15 degrees painfree. - MET  5. Pt to tolerate HEP exercise progression in 4 weeks - MET  6. Pt to ambulate 50 ft without antalgic pattern and demonstrate improved push off with regular shoe and brace in 4 weeks - MET  7. Pt to report pain decreased 50% or more with initial standing after sitting. - MET    LTG  1. Pt to demonstrate R ankle 4=/5 or better all directions by D/C for squat, stairs. - PARTIALLY MET  2. Pt to demonstrate ankle DF 8 degrees or better (ke and kf)  By D/C needed for amb and desc stairs.-  MET  3. Pt to desc 8\" steps with recirprocal pattern by D/C - NOT MET  4. Pt to return to walking 1 mile with incline with proper shoewear and no increased R ankle symptoms by D/C - NOT MET  5. FAAm score improved to 90% or better for increased functional tolerance - PARTIALLY MET  6. Pt to stand 20 mins to cook meal without increased R ankle pain by D/C- MET  7. Pt to voice readiness for D/C to independent HEP and self management of symptoms- NOT MET      Prognosis to achieve goals: good    PT Signature: Gracia Lane PT, DPT  IN [place]: [id number]    Electronically signed by Gracia Lane PT, 11/15/22, 4:04 PM " EST    Certification Period: 11/15/2022 thru 2/12/2023  I certify that the therapy services are furnished while this patient is under my care.  The services outlined above are required by this patient, and will be reviewed every 90 days.    Based upon review of the patient's progress and continued therapy plan, it is my medical opinion that Smiley Sanchez should continue physical therapy treatment at [facility]  [address] [place] [address] [place] [address] [phone].    Signature: __________________________________  Mamie Gongora MD    Please sign and return via fax to [email or web address] . Thank you, [facility]    Timed:         Manual Therapy:  35       mins  87944;     Therapeutic Exercise:  10     mins  29494;     Neuromuscular Ivan:        mins  33164;    Therapeutic Activity:          mins  04355;     Gait Training:           mins  26466;     Ultrasound:          mins  11265;    Ionto                                   mins   73584  Self Care                            mins   89766  Aquatic                               mins 57820      Un-Timed:  Electrical Stimulation:         mins  85395 ( );  Dry Needling          mins self-pay  Traction          mins 91618  Low Eval          Mins  18759  Mod Eval          Mins  51262  High Eval                            Mins  86763  Re-Eval                               mins  05774      Timed Treatment:   45   mins   Total Treatment:     45   mins

## 2022-11-22 ENCOUNTER — TREATMENT (OUTPATIENT)
Dept: PHYSICAL THERAPY | Facility: CLINIC | Age: 62
End: 2022-11-22

## 2022-11-22 DIAGNOSIS — M25.571 RIGHT ANKLE PAIN, UNSPECIFIED CHRONICITY: Primary | ICD-10-CM

## 2022-11-22 PROCEDURE — 97110 THERAPEUTIC EXERCISES: CPT | Performed by: PHYSICAL THERAPIST

## 2022-11-22 PROCEDURE — 97140 MANUAL THERAPY 1/> REGIONS: CPT | Performed by: PHYSICAL THERAPIST

## 2022-11-22 NOTE — PROGRESS NOTES
Physical Therapy Treatment Note    [address]   [place] [address]  [phone] (p)  [phone] (f)    VISIT#: 9     Diagnosis Plan   1. Right ankle pain, unspecified chronicity          Subjective   Smiley Sanchez reports: Pain intermittent only with certain movements/WB. She has been trying to walk with old shoes, cant walk very far. She reports gained 50 lbs this year. She reports doing HEP regularly. Discomfort just posterior to fibula today.      Objective     See Exercise, Manual, and Modality Logs for complete treatment.     Patient Education: encourage biking or treadmill ambulation for exercise only within tolerance. Continue to reiterate importance of wearing her good supportive shoes during exercise, not old unsupportive shoes.    Improved first ray depression. Hindfoot hypomobility remains. Calcaneal inversion.    Painfree palpation and tapping/percussion at fibula. Mild tenderness and edema evertor tendons.    Assessment/Plan Pt with occasional posterior impingement manual and therex but minimal and quickly resolved, modifications made as needed. Good tolerance to gait on treadmill, cues for heel-toe pattern on R. Limited by endurance vs. Pain, pt reports no increased ankle pain with walking today.     Progress per Plan of Care             Timed:         Manual Therapy:    25     mins  56663;     Therapeutic Exercise:    13     mins  22916;     Neuromuscular Ivan:        mins  16224;    Therapeutic Activity:          mins  24727;     Gait Training:           mins  75623;     Ultrasound:     8     mins  58278;    Ionto                                   mins   20531  Self Care                            mins   81593  Canalith Repos                   mins  4209  Aquatic                              mins 51105    Un-Timed:  Electrical Stimulation:         mins  66692 ( );  Dry Needling          mins self-pay  Traction          mins 06766  Low Eval         Mins  05793  Mod Eval           Mins  92548  High Eval                            Mins  91938  Re-Eval                               mins  87869    Timed Treatment:   46   mins   Total Treatment:     46   mins    Gracia Lane PT, DPT  [place]: [id number]

## 2022-11-29 ENCOUNTER — TREATMENT (OUTPATIENT)
Dept: PHYSICAL THERAPY | Facility: CLINIC | Age: 62
End: 2022-11-29

## 2022-11-29 DIAGNOSIS — M25.571 RIGHT ANKLE PAIN, UNSPECIFIED CHRONICITY: Primary | ICD-10-CM

## 2022-11-29 PROCEDURE — 97140 MANUAL THERAPY 1/> REGIONS: CPT | Performed by: PHYSICAL THERAPIST

## 2022-11-29 PROCEDURE — 97035 APP MDLTY 1+ULTRASOUND EA 15: CPT | Performed by: PHYSICAL THERAPIST

## 2022-11-29 PROCEDURE — 97110 THERAPEUTIC EXERCISES: CPT | Performed by: PHYSICAL THERAPIST

## 2022-11-29 NOTE — PROGRESS NOTES
"Physical Therapy Treatment Note    [address] [facility] [place] [address]  [phone] (p)  [phone] (f)    VISIT#: 10     Diagnosis Plan   1. Right ankle pain, unspecified chronicity          Subjective   Smiley Sanchez reports: was really sore after last visit, could barely walk later that night. But next day she woke up better was so much better, best it had felt, \"like a new foot.\" Positive effect lasted for a few days. Requests US again.    Objective     See Exercise, Manual, and Modality Logs for complete treatment.     Mild tenderness with US posterior to fibula, resolved. No tenderness evertor tendon to palpation and percussion negative at fibula.     Assessment/Plan Good tolerance to manual today, capsular creep noted with inversion. Pt reports no adverse effect with treatment today.      Progress per Plan of Care Monitor response.             Timed:         Manual Therapy:   22     mins  98116;     Therapeutic Exercise:     10    mins  01107;     Neuromuscular Ivan:        mins  96355;    Therapeutic Activity:          mins  20887;     Gait Training:           mins  01421;     Ultrasound:    8      mins  41461;    Ionto                                   mins   88541  Self Care                            mins   63593  Canalith Repos                   mins  4209  Aquatic                               mins 34715    Un-Timed:  Electrical Stimulation:         mins  32591 ( );  Dry Needling          mins self-pay  Traction          mins 21617  Low Eval          Mins  24018  Mod Eval          Mins  35126  High Eval                            Mins  74620  Re-Eval                               mins  15378    Timed Treatment:   40   mins   Total Treatment:     40   mins    Gracia Lane, PT, DPT  IN [place]: [id number]  "

## 2022-12-13 ENCOUNTER — LAB (OUTPATIENT)
Dept: FAMILY MEDICINE CLINIC | Facility: CLINIC | Age: 62
End: 2022-12-13

## 2022-12-13 ENCOUNTER — OFFICE VISIT (OUTPATIENT)
Dept: FAMILY MEDICINE CLINIC | Facility: CLINIC | Age: 62
End: 2022-12-13
Payer: MEDICAID

## 2022-12-13 VITALS
BODY MASS INDEX: 41.11 KG/M2 | OXYGEN SATURATION: 96 % | WEIGHT: 232 LBS | TEMPERATURE: 98.4 F | SYSTOLIC BLOOD PRESSURE: 150 MMHG | HEART RATE: 64 BPM | HEIGHT: 63 IN | DIASTOLIC BLOOD PRESSURE: 101 MMHG

## 2022-12-13 DIAGNOSIS — R53.83 OTHER FATIGUE: ICD-10-CM

## 2022-12-13 DIAGNOSIS — L65.9 HAIR THINNING: ICD-10-CM

## 2022-12-13 DIAGNOSIS — Z12.31 ENCOUNTER FOR SCREENING MAMMOGRAM FOR BREAST CANCER: ICD-10-CM

## 2022-12-13 DIAGNOSIS — L65.9 HAIR THINNING: Primary | ICD-10-CM

## 2022-12-13 LAB
ALBUMIN SERPL-MCNC: 4.6 G/DL (ref 3.5–5.2)
ALBUMIN/GLOB SERPL: 1.4 G/DL
ALP SERPL-CCNC: 94 U/L (ref 39–117)
ALT SERPL W P-5'-P-CCNC: 37 U/L (ref 1–33)
ANION GAP SERPL CALCULATED.3IONS-SCNC: 14 MMOL/L (ref 5–15)
AST SERPL-CCNC: 29 U/L (ref 1–32)
BASOPHILS # BLD AUTO: 0 10*3/MM3 (ref 0–0.2)
BASOPHILS NFR BLD AUTO: 0.4 % (ref 0–1.5)
BILIRUB SERPL-MCNC: 0.3 MG/DL (ref 0–1.2)
BUN SERPL-MCNC: 17 MG/DL (ref 8–23)
BUN/CREAT SERPL: 18.9 (ref 7–25)
CALCIUM SPEC-SCNC: 9.8 MG/DL (ref 8.6–10.5)
CHLORIDE SERPL-SCNC: 100 MMOL/L (ref 98–107)
CO2 SERPL-SCNC: 25 MMOL/L (ref 22–29)
CREAT SERPL-MCNC: 0.9 MG/DL (ref 0.57–1)
DEPRECATED RDW RBC AUTO: 44.6 FL (ref 37–54)
EGFRCR SERPLBLD CKD-EPI 2021: 72.4 ML/MIN/1.73
EOSINOPHIL # BLD AUTO: 0.1 10*3/MM3 (ref 0–0.4)
EOSINOPHIL NFR BLD AUTO: 1.4 % (ref 0.3–6.2)
ERYTHROCYTE [DISTWIDTH] IN BLOOD BY AUTOMATED COUNT: 13.7 % (ref 12.3–15.4)
GLOBULIN UR ELPH-MCNC: 3.2 GM/DL
GLUCOSE SERPL-MCNC: 86 MG/DL (ref 65–99)
HCT VFR BLD AUTO: 46.3 % (ref 34–46.6)
HGB BLD-MCNC: 15.5 G/DL (ref 12–15.9)
IRON 24H UR-MRATE: 74 MCG/DL (ref 37–145)
IRON SATN MFR SERPL: 20 % (ref 20–50)
LYMPHOCYTES # BLD AUTO: 3.2 10*3/MM3 (ref 0.7–3.1)
LYMPHOCYTES NFR BLD AUTO: 35.5 % (ref 19.6–45.3)
MCH RBC QN AUTO: 29.7 PG (ref 26.6–33)
MCHC RBC AUTO-ENTMCNC: 33.4 G/DL (ref 31.5–35.7)
MCV RBC AUTO: 89 FL (ref 79–97)
MONOCYTES # BLD AUTO: 0.4 10*3/MM3 (ref 0.1–0.9)
MONOCYTES NFR BLD AUTO: 4.5 % (ref 5–12)
NEUTROPHILS NFR BLD AUTO: 5.3 10*3/MM3 (ref 1.7–7)
NEUTROPHILS NFR BLD AUTO: 58.2 % (ref 42.7–76)
NRBC BLD AUTO-RTO: 0 /100 WBC (ref 0–0.2)
PLATELET # BLD AUTO: 277 10*3/MM3 (ref 140–450)
PMV BLD AUTO: 7.7 FL (ref 6–12)
POTASSIUM SERPL-SCNC: 3.9 MMOL/L (ref 3.5–5.2)
PROT SERPL-MCNC: 7.8 G/DL (ref 6–8.5)
RBC # BLD AUTO: 5.2 10*6/MM3 (ref 3.77–5.28)
SODIUM SERPL-SCNC: 139 MMOL/L (ref 136–145)
T4 FREE SERPL-MCNC: 1.02 NG/DL (ref 0.93–1.7)
TIBC SERPL-MCNC: 374 MCG/DL (ref 298–536)
TRANSFERRIN SERPL-MCNC: 251 MG/DL (ref 200–360)
TSH SERPL DL<=0.05 MIU/L-ACNC: 5.72 UIU/ML (ref 0.27–4.2)
WBC NRBC COR # BLD: 9.2 10*3/MM3 (ref 3.4–10.8)

## 2022-12-13 PROCEDURE — 80050 GENERAL HEALTH PANEL: CPT | Performed by: FAMILY MEDICINE

## 2022-12-13 PROCEDURE — 82306 VITAMIN D 25 HYDROXY: CPT | Performed by: FAMILY MEDICINE

## 2022-12-13 PROCEDURE — 84480 ASSAY TRIIODOTHYRONINE (T3): CPT | Performed by: FAMILY MEDICINE

## 2022-12-13 PROCEDURE — 84439 ASSAY OF FREE THYROXINE: CPT | Performed by: FAMILY MEDICINE

## 2022-12-13 PROCEDURE — 1160F RVW MEDS BY RX/DR IN RCRD: CPT | Performed by: FAMILY MEDICINE

## 2022-12-13 PROCEDURE — 84466 ASSAY OF TRANSFERRIN: CPT | Performed by: FAMILY MEDICINE

## 2022-12-13 PROCEDURE — 83540 ASSAY OF IRON: CPT | Performed by: FAMILY MEDICINE

## 2022-12-13 PROCEDURE — 1159F MED LIST DOCD IN RCRD: CPT | Performed by: FAMILY MEDICINE

## 2022-12-13 PROCEDURE — 99213 OFFICE O/P EST LOW 20 MIN: CPT | Performed by: FAMILY MEDICINE

## 2022-12-13 PROCEDURE — 82607 VITAMIN B-12: CPT | Performed by: FAMILY MEDICINE

## 2022-12-13 PROCEDURE — 36415 COLL VENOUS BLD VENIPUNCTURE: CPT

## 2022-12-13 NOTE — PROGRESS NOTES
Chief Complaint  Hair/Scalp Problem (Thinning and breakage in hair )    Subjective        Smiley Sanchez presents to [facility]  History of Present Illness  Her hairdresser told her she needed to get her thyroid tested because she has too much hair breakage.  She had a lot of stress in the last 2 years with her mother's death and father's illness and work stress.  She has done some studying and thinks her iron or thyroid could be abnormal. She feels like her hair is regrowing in now. She is no longer working.  She lives with her father. She know she needs to schedule a wellness exam and labs but does not want to do it today. She has previously been diagnosed with hypothyroidism but is not taking any medicine for it. She previously treated herself with iodine drops in water and wants to try iodine painted on her feet with a paintbrush.        Fatigue  This is a new problem. Associated symptoms include fatigue.       Objective   Vital Signs:  BP (!) 150/101 (BP Location: Left arm, Patient Position: Sitting, Cuff Size: Large Adult)   Pulse 64   Temp 98.4 °F (36.9 °C) (Infrared)   Ht 160 cm (63\")   Wt 105 kg (232 lb)   SpO2 96%   BMI 41.10 kg/m²   Estimated body mass index is 41.1 kg/m² as calculated from the following:    Height as of this encounter: 160 cm (63\").    Weight as of this encounter: 105 kg (232 lb).    Class 3 Severe Obesity (BMI >=40). Obesity-related health conditions include the following: hypertension. Obesity is unchanged. BMI is is above average; BMI management plan is completed. We discussed portion control and increasing exercise.      Physical Exam   Result Review :  The following data was reviewed by: Mamie Gongora MD on 12/13/2022:  Common labs    Common Labs 12/13/22 12/13/22    1329 1329   Glucose  86   BUN  17   Creatinine  0.90   Sodium  139   Potassium  3.9   Chloride  100   Calcium  9.8   Albumin  4.60   Total Bilirubin  0.3   Alkaline  Phosphatase  94   AST (SGOT)  29   ALT (SGPT)  37 (A)   WBC 9.20    Hemoglobin 15.5    Hematocrit 46.3    Platelets 277    (A) Abnormal value                      Assessment and Plan   Diagnoses and all orders for this visit:    1. Hair thinning (Primary)  -     TSH  -     CBC & Differential  -     Comprehensive Metabolic Panel  -     Iron Profile; Future  -     T3; Future  -     T4, free; Future  -     Vitamin D,25-Hydroxy  -     Vitamin B12    2. Other fatigue  -     TSH  -     CBC & Differential  -     Comprehensive Metabolic Panel  -     Iron Profile; Future  -     T3; Future  -     T4, free; Future  -     Vitamin D,25-Hydroxy  -     Vitamin B12    3. Encounter for screening mammogram for breast cancer  -     Mammo Screening Digital Tomosynthesis Bilateral With CAD             Follow Up   No follow-ups on file.  Patient was given instructions and counseling regarding her condition or for health maintenance advice. Please see specific information pulled into the AVS if appropriate.

## 2022-12-14 LAB
25(OH)D3 SERPL-MCNC: 43.3 NG/ML (ref 30–100)
T3 SERPL-MCNC: 131 NG/DL (ref 80–200)
VIT B12 BLD-MCNC: 448 PG/ML (ref 211–946)

## 2022-12-15 ENCOUNTER — TREATMENT (OUTPATIENT)
Dept: PHYSICAL THERAPY | Facility: CLINIC | Age: 62
End: 2022-12-15

## 2022-12-15 DIAGNOSIS — M25.571 RIGHT ANKLE PAIN, UNSPECIFIED CHRONICITY: Primary | ICD-10-CM

## 2022-12-15 PROCEDURE — 97140 MANUAL THERAPY 1/> REGIONS: CPT | Performed by: PHYSICAL THERAPIST

## 2022-12-15 NOTE — PROGRESS NOTES
"Physical Therapy Treatment Note    [address] [facility] [place] [address]  [phone] (p)  [phone] (f)    VISIT#: 11/13 POC     Diagnosis Plan   1. Right ankle pain, unspecified chronicity          Subjective   Smiley Sanchez reports: has come a long way since eval. Couldn't hardly walk on it, could barely weight bear when she started. Now is intermittent, gives trouble going down stairs or curbs, other than that a lot better. Doing HEP except has not done them in a few days. Has been swollen more, anteromedial ankle since going shopping a few days ago. She was able to tolerate ~4 hours, slow speed. Has tried compression socks but not comfortable. She went to PCP, has gained 100 lbs. She was offered appt for ortho, chose not to get referral due to does not want surgery. She is doing a lot better but not yet returned to baseline/PLOF which includes being up on feet all day, walking and shopping unlimited time and gym exercise including multiple miles/high speed on treadmill. She feels that her ankle symptoms are related to bad sprain years ago that \"never really healed right.\"    Subjective Questionnaire: FAAM: 69/80 = 86% (66% eval and 79% last tested)  Clinical Progress: improved  Home Program Compliance: Yes  Treatment has included: therapeutic exercise, manual therapy, therapeutic activity, gait training, ultrasound and cryotherapy      Objective     R Ankle ROM:   DF ke 10  DF kf 8     Inv 42   Ev 15  PF WFL unremarkable     Strength is 4+/5 sitting, all painfree, standing heel raise not tested.      Functional hip ER: independent bilat     Single leg balance not tested this date.     Foot structure: forefoot varus, calcaneal inversion. Hammer toe present.    Hindfoot hypomobility. Mild great toe ext hypomobility R     Standing: overpronation and navicular drop in standing. Less with shoewear but still significant.      Ambulation without antalgia, calcaneal inversion, mild navicular drop, genu valgus, " "overpronation. Good push off.      Mild edema posterolateral R ankle behind lateral malleolus, anteromedial ankle    Education: biking program multiple times per week for cardio, ROM, strength and weight loss goals with less WB R ankle. Also discussed how orthopedic can provide other treatment options and possible further imaging, not necessarily surgery.     Assessment/Plan: 1 episode medial ankle pain with movement but quickly resolved. Painfree resisted motion. Increase in PROM with less pain end ranges. Improving accessory motion and capsular mobility. Good tolerance Pt continues to make slow progress toward functional goals. Walking tolerance is increasing from household to community distances/shopping. Still limited with steps, quick walking speed, and participation in PLOF and exercise at gym.    Plan Goals: ST.  Pt independent with HEP in 2 visits - MET  2.  Pt to demonstrate ankle DF (ke and kf) AROM to 5 degrees or better in 3 weeks - MET  3. Decreased edema R ankle in 3 weeks - PARTIALLY MET  4. Pt to demonstrate AROM ankle ev 15 degrees painfree. -  MET  5. Pt to tolerate HEP exercise progression in 4 weeks - MET  6. Pt to ambulate 50 ft without antalgic pattern and demonstrate improved push off with regular shoe and brace in 4 weeks - MET  7. Pt to report pain decreased 50% or more with initial standing after sitting. - MET    LTG  1. Pt to demonstrate R ankle 4+/5 or better all directions by D/C for squat, stairs. - MET  2. Pt to demonstrate ankle DF 8 degrees or better (ke and kf)  By D/C needed for amb and desc stairs.-  MET  3. Pt to desc 8\" steps with recirprocal pattern by D/C - PARTIALLY MET  4. Pt to return to walking 1 mile with incline with proper shoewear and no increased R ankle symptoms by D/C -  MET (slow speed)  5. FAAm score improved to 90% or better for increased functional tolerance - PARTIALLY MET  6. Pt to stand 20 mins to cook meal without increased R ankle pain by D/C- MET  7. " Pt to voice readiness for D/C to independent HEP and self management of symptoms- NOT MET       Progress per Plan of Care Resume US as needed next visit            Timed:         Manual Therapy:    43     mins  91430;     Therapeutic Exercise:         mins  59856;     Neuromuscular Ivan:        mins  79153;    Therapeutic Activity:          mins  90794;     Gait Training:           mins  97688;     Ultrasound:          mins  50626;    Ionto                                   mins   20283  Self Care                            mins   43708  Canalith Repos                  mins  4209  Aquatic                               mins 08150    Un-Timed:  Electrical Stimulation:         mins  37388 ( );  Dry Needling          mins self-pay  Traction          mins 32107  Low Eval          Mins  19349  Mod Eval          Mins  78277  High Eval                            Mins  21860  Re-Eval                               mins  45614    Timed Treatment:   43   mins   Total Treatment:     43   mins    Gracia Lane PT, DPT  IN [place]: [id number]

## 2022-12-15 NOTE — PROGRESS NOTES
"Re-Assessment / Re-Certification      [address] [facility] [place] [address]  [phone] (p)  [phone] (f)    Patient: Smiley Sanchez   : 1960  Diagnosis/ICD-10 Code:  Right ankle pain, unspecified chronicity [M25.571]  Referring practitioner: Mamie Gongora MD  Date of Initial Visit: Type: THERAPY  Noted: 2022  Today's Date: 12/15/2022  Patient seen for 11 sessions      Subjective:   Smiley Sanchez reports: has come a long way since eval. Couldn't hardly walk on it, could barely weight bear when she started. Now is intermittent, gives trouble going down stairs or curbs, other than that a lot better. Doing HEP except has not done them in a few days. Has been swollen more, anteromedial ankle since going shopping a few days ago. She was able to tolerate ~4 hours, slow speed. Has tried compression socks but not comfortable. She went to PCP, has gained 100 lbs. She was offered appt for ortho, chose not to get referral due to does not want surgery. She is doing a lot better but not yet returned to baseline/PLOF which includes being up on feet all day, walking and shopping unlimited time and gym exercise including multiple miles/high speed on treadmill. She feels that her ankle symptoms are related to bad sprain years ago that \"never really healed right.\"    Subjective Questionnaire: FAAM: 69/80 = 86% (66% eval and 79% last tested)  Clinical Progress: improved  Home Program Compliance: Yes  Treatment has included: therapeutic exercise, manual therapy, therapeutic activity, gait training, ultrasound and cryotherapy    Subjective   Objective     R Ankle ROM:   DF ke 10  DF kf 8     Inv 42   Ev 15  PF WFL unremarkable     Strength is 4+/5 sitting, all painfree, standing heel raise not tested.      Functional hip ER: independent bilat     Single leg balance not tested this date.     Foot structure: forefoot varus, calcaneal inversion.      Ambulation without antalgia, calcaneal inversion, mild " "navicular drop, genu valgus, overpronation. Good push off.      Mild edema posterolateral R ankle behind lateral malleolus, anteromedial ankle    Education: biking program multiple times per week for cardio, ROM, strength and weight loss goals with less WB R ankle. Also discussed how orthopedic can provide other treatment options and possible further imaging, not necessarily surgery.     Assessment/Plan     Plan Goals: ST.  Pt independent with HEP in 2 visits - MET  2.  Pt to demonstrate ankle DF 9ke and kf) AROM to 5 degrees or better in 3 weeks - MET  3. Decreased edema R ankle in 3 weeks - PARTIALLY MET  4. Pt to demonstrate AROM ankle ev 15 degrees painfree. -  MET  5. Pt to tolerate HEP exercise progression in 4 weeks - MET  6. Pt to ambulate 50 ft without antalgic pattern and demonstrate improved push off with regular shoe and brace in 4 weeks - MET  7. Pt to report pain decreased 50% or more with initial standing after sitting. - MET    LTG  1. Pt to demonstrate R ankle 4+/5 or better all directions by D/C for squat, stairs. - MET  2. Pt to demonstrate ankle DF 8 degrees or better (ke and kf)  By D/C needed for amb and desc stairs.-  MET  3. Pt to desc 8\" steps with recirprocal pattern by D/C - PARTIALLY MET  4. Pt to return to walking 1 mile with incline with proper shoewear and no increased R ankle symptoms by D/C -  MET (slow speed)  5. FAAm score improved to 90% or better for increased functional tolerance - PARTIALLY MET  6. Pt to stand 20 mins to cook meal without increased R ankle pain by D/C- MET  7. Pt to voice readiness for D/C to independent HEP and self management of symptoms- NOT MET       Progress toward previous goals: Partially Met  Recommendations: Continue as planned    Prognosis to achieve goals: good    PT Signature: Gracia Lane PT, DPT  IN LICENCE: [id number]    Electronically signed by Gracia Lane PT, 12/15/22, 9:53 AM EST    Certification Period: 12/15/2022 thru 3/14/2023  I " certify that the therapy services are furnished while this patient is under my care.  The services outlined above are required by this patient, and will be reviewed every 90 days.    Based upon review of the patient's progress and continued therapy plan, it is my medical opinion that Smiley Sanchez should continue physical therapy treatment at [facility]  [address] [facility] [place] [address] [phone].    Signature: __________________________________  Mamie Gongora MD    Please sign and return via fax to [phone]. Thank you, [facility]    Timed:         Manual Therapy:    35     mins  42266;     Therapeutic Exercise:    10     mins  34386;     Neuromuscular Ivan:        mins  42174;    Therapeutic Activity:          mins  39568;     Gait Training:           mins  25431;     Ultrasound:          mins  53267;    Ionto                                   mins   28876  Self Care                            mins   18985  Aquatic                               mins 88486      Un-Timed:  Electrical Stimulation:         mins  70595 ( );  Dry Needling          mins self-pay  Traction          mins 94700  Low Eval          Mins  32114  Mod Eval          Mins  33401  High Eval                            Mins  81731  Re-Eval                               mins  67604      Timed Treatment:   45   mins   Total Treatment:     45   mins

## 2022-12-21 ENCOUNTER — TREATMENT (OUTPATIENT)
Dept: PHYSICAL THERAPY | Facility: CLINIC | Age: 62
End: 2022-12-21

## 2022-12-21 DIAGNOSIS — M25.571 RIGHT ANKLE PAIN, UNSPECIFIED CHRONICITY: Primary | ICD-10-CM

## 2022-12-21 PROCEDURE — 97140 MANUAL THERAPY 1/> REGIONS: CPT | Performed by: PHYSICAL THERAPIST

## 2022-12-21 PROCEDURE — 97110 THERAPEUTIC EXERCISES: CPT | Performed by: PHYSICAL THERAPIST

## 2022-12-21 NOTE — PROGRESS NOTES
Physical Therapy Treatment Note    [address]   [place] [address]  [phone] (p)  [phone] (f)    VISIT#: 12     Diagnosis Plan   1. Right ankle pain, unspecified chronicity          Subjective   Smiley Sanchez reports:  Still doing better but not back to PLOF. Aggravates if standing cooking too long, has to sit and take a break occasionally. Going to get a padded mat to see if helps. Pulling lateral ankle with desc tall step. Learning Japanese, does ankle ROM exercises with Japanese alphabet to practice.       Objective     See Exercise, Manual, and Modality Logs for complete treatment.     Patient Education: wear shoes when cooking standing, encourage weight shift gentle mobility     Bilat swelling anteromedial ankle and lateral, medial malleolus. Symmetrical.    Added toe yoga and abd to HEP, combined toe/ankle motion, seated gastroc/hamstring stretch with belt. HEP2Go handout.    Better stretch felt with sitting gastroc belt stretch vs. Long sitting.    Assessment/Plan Progressed foot intrinsic strengthening and mobility. Good tolerance except 1 episode of pain distal toe with toe extesion stretching. Resolved with rest, painfree ROM following.      Progress per Plan of Care Consider DF splint while sleeping.Plan to D/C to HEP and self management of symptoms next visit.            Timed:         Manual Therapy:   20     mins  82772;     Therapeutic Exercise:    30     mins  06101;     Neuromuscular Ivan:        mins  07871;    Therapeutic Activity:          mins  61619;     Gait Training:           mins  85786;     Ultrasound:          mins  27241;    Ionto                                   mins   29550  Self Care                            mins   35745  Canalith Repos                   mins  4209  Aquatic                               mins 75837    Un-Timed:  Electrical Stimulation:         mins  73467 ( );  Dry Needling          mins self-pay  Traction          mins 38158  Low Eval           Mins  02657  Mod Eval          Mins  34246  High Eval                            Mins  49440  Re-Eval                               mins  36575    Timed Treatment:  50    mins   Total Treatment:     50   mins    Gracia Lane PT, DPT  IN [place]: [id number]

## 2023-01-04 ENCOUNTER — TELEPHONE (OUTPATIENT)
Dept: FAMILY MEDICINE CLINIC | Facility: CLINIC | Age: 63
End: 2023-01-04

## 2023-01-04 NOTE — TELEPHONE ENCOUNTER
Caller: Smiley Sanchez    Relationship: Self    Best call back number: [phone]    Who are you requesting to speak with (clinical staff, provider,  specific staff member): DR ALVARADO OR MA    What was the call regarding: PATIENT STATES THAT SHE RECEIVED A CALL TO SCHEDULE A MAMMOGRAM. SHE DID NOT RECALL EVER DISCUSSING THIS WITH DR ALVARADO AT HER LAST APPOINTMENT, AND DOES NOT FEEL IT IS NEEDED AT THIS TIME. REQUESTS A CALL BACK TO DISCUSS FURTHER    Do you require a callback: YES

## 2023-01-05 ENCOUNTER — TREATMENT (OUTPATIENT)
Dept: PHYSICAL THERAPY | Facility: CLINIC | Age: 63
End: 2023-01-05
Payer: MEDICAID

## 2023-01-05 DIAGNOSIS — M25.571 RIGHT ANKLE PAIN, UNSPECIFIED CHRONICITY: Primary | ICD-10-CM

## 2023-01-05 PROCEDURE — 97110 THERAPEUTIC EXERCISES: CPT | Performed by: PHYSICAL THERAPIST

## 2023-01-05 NOTE — PROGRESS NOTES
Physical Therapy Treatment Note/Discharge    [address]   [place] [address]  [phone] (p)  [phone] (f)    Smiley Sanchez  60    VISIT#: 13     Diagnosis Plan   1. Right ankle pain, unspecified chronicity          Subjective   Smiley Sanchez reports: been elevating feet intermittently to manage edema, helping. She has not done any biking since last visit. Cant get herself to go to the gym. Overall continues to do a lot better, the only problem left is going down stairs or going down curb, sometimes pulls sometimes doesn't, more fearful due to pain in the past. Didn't do new HEP exercises but still doing previous ankle exercises \"because they work so well.\" She is pleased with her progress and feels she has the tools to address symptoms independently.     Objective     FAAM: 74/84 = 88%    See Exercise, Manual, and Modality Logs for complete treatment.     Patient Education: recommended biking for main cardio exercise with decreased WB, functional and/or short distance walking with good shoes with intermittent breaks as needed within her tolerance.    Recommended pt work on step down on 2\" step, mini squat and slowly progress to work on step/curb.     Foot structure: forefoot varus, calcaneal inversion. Hammer toe present. Forefoot equinus.    DF ke 10  DF kf not measured  ev 30  Inv 42  PF WFL    Strength: unremarkable    Ambulation without antalgia, calcaneal inversion, mild navicular drop, genu valgus, overpronation. Good push off.    Mild edema bilat ankles, decreased from last visit.    Improved calcaneal mobility, hindfoot mobility.    Single leg balance not tested this date.    Assessment/Plan Fatigue posterior tib otherwise well tolerated. Pt requires frequent cues to stay on task.  Increased ROM noted. improvement in FAAM score, pt reports even ones marked as slight difficulty are intermittent.     ST.  Pt independent with HEP in 2 visits - MET  2.  Pt to demonstrate ankle DF (ke  and kf) AROM to 5 degrees or better in 3 weeks - MET  3. Decreased edema R ankle in 3 weeks - PARTIALLY MET  4. Pt to demonstrate AROM ankle ev 15 degrees painfree. -  MET  5. Pt to tolerate HEP exercise progression in 4 weeks - MET  6. Pt to ambulate 50 ft without antalgic pattern and demonstrate improved push off with regular shoe and brace in 4 weeks - MET  7. Pt to report pain decreased 50% or more with initial standing after sitting. - MET    LTG  1. Pt to demonstrate R ankle 4+/5 or better all directions by D/C for squat, stairs. - MET  2. Pt to demonstrate ankle DF 8 degrees or better (ke and kf)  By D/C needed for amb and desc stairs.-  MET  3. Pt to desc 8\" steps with recirprocal pattern by D/C - PARTIALLY MET  4. Pt to return to walking 1 mile with incline with proper shoewear and no increased R ankle symptoms by D/C -  MET (slow speed)  5. FAAm score improved to 90% or better for increased functional tolerance - PARTIALLY MET  6. Pt to stand 20 mins to cook meal without increased R ankle pain by D/C- MET  7. Pt to voice readiness for D/C to independent HEP and self management of symptoms- MET      Plan: Discharge from skilled PT to HEP and self management of symptoms. Pt plans to continue through the winter with HEP and biking program, has goal to lose weight. Pt significantly improved from eval and is motivated. I recommend that she return to PCP for further imaging/possible ortho referral if symptoms do not resolve by spring and she is not able to return to her PLOF/walking program.           Timed:         Manual Therapy:         mins  46906;     Therapeutic Exercise:    47     mins  11852;     Neuromuscular Ivan:        mins  52507;    Therapeutic Activity:          mins  88828;     Gait Training:           mins  95791;     Ultrasound:          mins  65189;    Ionto                                   mins   27498  Self Care                            mins   19264  Canalith Repos                   mins   4209  Aquatic                               mins 94512    Un-Timed:  Electrical Stimulation:         mins  28174 ( );  Dry Needling          mins self-pay  Traction          mins 06195  Low Eval          Mins  40278  Mod Eval          Mins  25555  High Eval                            Mins  61361  Re-Eval                               mins  84584    Timed Treatment:  47   mins   Total Treatment:     47   mins    Gracia Lane PT, DPT  IN [place]: [id number]

## 2023-01-05 NOTE — LETTER
Physical Therapy Treatment Note/Discharge    [address]   [place] [address]  [phone] (p)  [phone] (f)    Smiley Sanchez  60    VISIT#: [id number]     Diagnosis Plan   1. Right ankle pain, unspecified chronicity          Subjective   Smiley Sanchez reports: been elevating feet intermittently to manage edema, helping. She has not done any biking since last visit. Cant get herself to go to the gym. Overall continues to do a lot better, the only problem left is going down stairs or going down curb, sometimes pulls sometimes doesn't, more fearful due to pain in the past. Didn't do new HEP exercises but still doing previous ankle exercises \"because they work so well.\" She is pleased with her progress and feels she has the tools to address symptoms independently.     Objective     FAAM: 74/84 = 88%    See Exercise, Manual, and Modality Logs for complete treatment.     Patient Education: recommended biking for main cardio exercise with decreased WB, functional and/or short distance walking with good shoes with intermittent breaks as needed within her tolerance.    Recommended pt work on step down on 2\" step, mini squat and slowly progress to work on step/curb.     Foot structure: forefoot varus, calcaneal inversion. Hammer toe present. Forefoot equinus.    DF ke 10  DF kf not measured  ev 30  Inv 42  PF WFL    Strength: unremarkable    Ambulation without antalgia, calcaneal inversion, mild navicular drop, genu valgus, overpronation. Good push off.    Mild edema bilat ankles, decreased from last visit.    Improved calcaneal mobility, hindfoot mobility.    Single leg balance not tested this date.    Assessment/Plan Fatigue posterior tib otherwise well tolerated. Pt requires frequent cues to stay on task.  Increased ROM noted. improvement in FAAM score, pt reports even ones marked as slight difficulty are intermittent.     ST.  Pt independent with HEP in 2 visits - MET  2.  Pt to demonstrate ankle DF  (ke and kf) AROM to 5 degrees or better in 3 weeks - MET  3. Decreased edema R ankle in 3 weeks - PARTIALLY MET  4. Pt to demonstrate AROM ankle ev 15 degrees painfree. -  MET  5. Pt to tolerate HEP exercise progression in 4 weeks - MET  6. Pt to ambulate 50 ft without antalgic pattern and demonstrate improved push off with regular shoe and brace in 4 weeks - MET  7. Pt to report pain decreased 50% or more with initial standing after sitting. - MET    LTG  1. Pt to demonstrate R ankle 4+/5 or better all directions by D/C for squat, stairs. - MET  2. Pt to demonstrate ankle DF 8 degrees or better (ke and kf)  By D/C needed for amb and desc stairs.-  MET  3. Pt to desc 8\" steps with recirprocal pattern by D/C - PARTIALLY MET  4. Pt to return to walking 1 mile with incline with proper shoewear and no increased R ankle symptoms by D/C -  MET (slow speed)  5. FAAm score improved to 90% or better for increased functional tolerance - PARTIALLY MET  6. Pt to stand 20 mins to cook meal without increased R ankle pain by D/C- MET  7. Pt to voice readiness for D/C to independent HEP and self management of symptoms- MET      Plan: Discharge from skilled PT to HEP and self management of symptoms. Pt plans to continue through the winter with HEP and biking program, has goal to lose weight. Pt significantly improved from eval and is motivated. I recommend that she return to PCP for further imaging/possible ortho referral if symptoms do not resolve by spring and she is not able to return to her PLOF/walking program.          Timed:         Manual Therapy:         mins  69999;     Therapeutic Exercise:    47     mins  86922;     Neuromuscular Ivan:        mins  41983;    Therapeutic Activity:          mins  38278;     Gait Training:           mins  54050;     Ultrasound:          mins  69842;    Ionto                                   mins   21141  Self Care                            mins   64612  [facility]                    mins  4209  Aquatic                               mins 76294    Un-Timed:  Electrical Stimulation:         mins  46559 ( );  Dry Needling          mins self-pay  Traction          mins 75762  Low Eval          Mins  34619  Mod Eval          Mins  98472  High Eval                            Mins  97076  Re-Eval                               mins  73655    Timed Treatment:  47   mins   Total Treatment:     47   mins    Gracia Lane PT, DPT  IN [place]: [id number]      Thank you for this referral.

## 2023-07-25 ENCOUNTER — TELEPHONE (OUTPATIENT)
Dept: FAMILY MEDICINE CLINIC | Facility: CLINIC | Age: 63
End: 2023-07-25

## 2023-07-25 NOTE — TELEPHONE ENCOUNTER
Caller: Smiley Sanchez    Relationship: Self    Best call back number: [phone]     What is the best time to reach you: ANY DAY EXCEPT 7/27/2023     Who are you requesting to speak with (clinical staff, provider,  specific staff member): CLINICAL     Do you know the name of the person who called: SMILEY     What was the call regarding: SMILEY WOULD LIKE TO KNOW IF SHE HAS A COLONOSCOPY IF SHE WILL BE SEDATED?    Is it okay if the provider responds through MyChart: NO

## 2025-06-23 ENCOUNTER — TELEPHONE (OUTPATIENT)
Dept: FAMILY MEDICINE CLINIC | Facility: CLINIC | Age: 65
End: 2025-06-23

## 2025-06-23 NOTE — TELEPHONE ENCOUNTER
Caller: Smiley Sanchez    Relationship: Self    Best call back number: [phone]    What is the best time to reach you:     What was the call regarding: PATIENT WANTED TO LET DR. ALVARADO KNOW THAT SHE NEEDS TO WAIT UNTIL SHE CAN RESCHEDULE HER WELLNESS VISIT. SHE STATES THAT SHE IS TRYING TO FIGURE OUT WHAT INSURANCE SHE CAN BE ON WITH HER .

## 2025-07-11 ENCOUNTER — OFFICE VISIT (OUTPATIENT)
Dept: FAMILY MEDICINE CLINIC | Facility: CLINIC | Age: 65
End: 2025-07-11
Payer: MEDICARE

## 2025-07-11 ENCOUNTER — LAB (OUTPATIENT)
Dept: FAMILY MEDICINE CLINIC | Facility: CLINIC | Age: 65
End: 2025-07-11
Payer: MEDICARE

## 2025-07-11 ENCOUNTER — TELEPHONE (OUTPATIENT)
Dept: FAMILY MEDICINE CLINIC | Facility: CLINIC | Age: 65
End: 2025-07-11

## 2025-07-11 VITALS
HEIGHT: 63 IN | DIASTOLIC BLOOD PRESSURE: 88 MMHG | WEIGHT: 220 LBS | TEMPERATURE: 98 F | HEART RATE: 60 BPM | BODY MASS INDEX: 38.98 KG/M2 | OXYGEN SATURATION: 94 % | SYSTOLIC BLOOD PRESSURE: 138 MMHG

## 2025-07-11 DIAGNOSIS — Z13.1 SCREENING FOR DIABETES MELLITUS: ICD-10-CM

## 2025-07-11 DIAGNOSIS — Z13.6 SCREENING, ISCHEMIC HEART DISEASE: ICD-10-CM

## 2025-07-11 DIAGNOSIS — Z78.0 POSTMENOPAUSE: ICD-10-CM

## 2025-07-11 DIAGNOSIS — Z23 NEED FOR PNEUMOCOCCAL VACCINE: ICD-10-CM

## 2025-07-11 DIAGNOSIS — Z11.59 NEED FOR HEPATITIS C SCREENING TEST: ICD-10-CM

## 2025-07-11 DIAGNOSIS — Z12.12 ENCOUNTER FOR COLORECTAL CANCER SCREENING USING COLOGUARD TEST: ICD-10-CM

## 2025-07-11 DIAGNOSIS — Z12.11 ENCOUNTER FOR COLORECTAL CANCER SCREENING USING COLOGUARD TEST: ICD-10-CM

## 2025-07-11 DIAGNOSIS — Z12.31 ENCOUNTER FOR SCREENING MAMMOGRAM FOR MALIGNANT NEOPLASM OF BREAST: ICD-10-CM

## 2025-07-11 DIAGNOSIS — Z00.00 WELCOME TO MEDICARE PREVENTIVE VISIT: Primary | ICD-10-CM

## 2025-07-11 LAB
ALBUMIN SERPL-MCNC: 4.3 G/DL (ref 3.5–5.2)
ALBUMIN/GLOB SERPL: 1.3 G/DL
ALP SERPL-CCNC: 82 U/L (ref 39–117)
ALT SERPL W P-5'-P-CCNC: 26 U/L (ref 1–33)
ANION GAP SERPL CALCULATED.3IONS-SCNC: 11.1 MMOL/L (ref 5–15)
AST SERPL-CCNC: 27 U/L (ref 1–32)
BILIRUB SERPL-MCNC: 0.4 MG/DL (ref 0–1.2)
BUN SERPL-MCNC: 16 MG/DL (ref 8–23)
BUN/CREAT SERPL: 17.4 (ref 7–25)
CALCIUM SPEC-SCNC: 9.6 MG/DL (ref 8.6–10.5)
CHLORIDE SERPL-SCNC: 105 MMOL/L (ref 98–107)
CHOLEST SERPL-MCNC: 188 MG/DL (ref 0–200)
CO2 SERPL-SCNC: 24.9 MMOL/L (ref 22–29)
CREAT SERPL-MCNC: 0.92 MG/DL (ref 0.57–1)
EGFRCR SERPLBLD CKD-EPI 2021: 69.2 ML/MIN/1.73
GLOBULIN UR ELPH-MCNC: 3.3 GM/DL
GLUCOSE SERPL-MCNC: 85 MG/DL (ref 65–99)
HCV AB SER QL: NORMAL
HDLC SERPL-MCNC: 55 MG/DL (ref 40–60)
LDLC SERPL CALC-MCNC: 116 MG/DL (ref 0–100)
LDLC/HDLC SERPL: 2.08 {RATIO}
POTASSIUM SERPL-SCNC: 4.1 MMOL/L (ref 3.5–5.2)
PROT SERPL-MCNC: 7.6 G/DL (ref 6–8.5)
SODIUM SERPL-SCNC: 141 MMOL/L (ref 136–145)
TRIGL SERPL-MCNC: 93 MG/DL (ref 0–150)
VLDLC SERPL-MCNC: 17 MG/DL (ref 5–40)

## 2025-07-11 PROCEDURE — 36415 COLL VENOUS BLD VENIPUNCTURE: CPT | Performed by: FAMILY MEDICINE

## 2025-07-11 PROCEDURE — 80061 LIPID PANEL: CPT | Performed by: FAMILY MEDICINE

## 2025-07-11 PROCEDURE — 80053 COMPREHEN METABOLIC PANEL: CPT | Performed by: FAMILY MEDICINE

## 2025-07-11 PROCEDURE — 86803 HEPATITIS C AB TEST: CPT | Performed by: FAMILY MEDICINE

## 2025-07-11 RX ORDER — MULTIVIT WITH MINERALS/LUTEIN
500 TABLET ORAL 3 TIMES DAILY
COMMUNITY

## 2025-07-11 NOTE — ASSESSMENT & PLAN NOTE
Orders:    Cologuard - Stool, Per Rectum; Future    DEXA Bone Density Axial; Future    Hepatitis C Antibody    Mammo Screening Digital Tomosynthesis Bilateral With CAD; Future    Comprehensive Metabolic Panel    Lipid Panel

## 2025-07-11 NOTE — PROGRESS NOTES
Subjective   The ABCs of the Annual Wellness Visit  Medicare Wellness Visit      Smiley Sanchez is a 65 y.o. patient who presents for a Medicare Wellness Visit.    The following portions of the patient's history were reviewed and   updated as appropriate: allergies, current medications, past family history, past medical history, past social history, past surgical history, and problem list.    Compared to one year ago, the patient's physical   health is the same.  Compared to one year ago, the patient's mental   health is the same.    Recent Hospitalizations:  She was not admitted to the hospital during the last year.     Current Medical Providers:  Patient Care Team:  Mamie Gongora MD as PCP - General (Family Medicine)    Outpatient Medications Prior to Visit   Medication Sig Dispense Refill    Cascara [place] 450 MG capsule Take  by mouth.      Flavoring Agent (Molasses Flavor) powder Use.      vitamin C (ASCORBIC ACID) 250 MG tablet Take 2 tablets by mouth 3 times a day.      vitamin D3 125 MCG (5000 UT) capsule capsule Take 1 capsule by mouth Daily.       No facility-administered medications prior to visit.     No opioid medication identified on active medication list. I have reviewed chart for other potential  high risk medication/s and harmful drug interactions in the elderly.      Aspirin is not on active medication list.  Aspirin use is not indicated based on review of current medical condition/s. Risk of harm outweighs potential benefits.  .    Patient Active Problem List   Diagnosis    Change in bowel habits    Fatigue    Hypothyroidism    Left lower quadrant abdominal pain    Right ankle pain    Welcome to Medicare preventive visit    Encounter for colorectal cancer screening using Cologuard test    Postmenopause    Need for hepatitis C screening test    Encounter for screening mammogram for malignant neoplasm of breast    Need for pneumococcal vaccine    Screening, ischemic heart disease     "Screening for diabetes mellitus     Advance Care Planning Advance Directive is not on file.  ACP discussion was held with the patient during this visit. Patient does not have an advance directive, information provided.            Objective   Vitals:    07/11/25 1027 07/11/25 1102   BP: 147/86 138/88   BP Location: Left arm    Patient Position: Sitting    Cuff Size: Adult    Pulse: 60    Temp: 98 °F (36.7 °C)    TempSrc: Temporal    SpO2: 94%    Weight: 99.8 kg (220 lb)    Height: 160 cm (63\")    PainSc: 0-No pain        Estimated body mass index is 38.97 kg/m² as calculated from the following:    Height as of this encounter: 160 cm (63\").    Weight as of this encounter: 99.8 kg (220 lb).    Class 2 Severe Obesity (BMI >=35 and <=39.9). Obesity-related health conditions include the following: none. Obesity is unchanged. BMI is is above average; BMI management plan is completed. We discussed increasing exercise.        Gait and Balance Evaluation:  Normal  Vision Screening    Right eye Left eye Both eyes   Without correction 20/50 20/40 20/40   With correction          Does the patient have evidence of cognitive impairment? No   Physical Exam  Vitals and nursing note reviewed.   Constitutional:       General: She is not in acute distress.     Appearance: She is well-developed.   HENT:      Head: Normocephalic.      Mouth/Throat:      Dentition: Abnormal dentition.   Eyes:      General: Lids are normal.      Conjunctiva/sclera: Conjunctivae normal.   Neck:      Thyroid: No thyroid mass or thyromegaly.      Trachea: Trachea normal.   Cardiovascular:      Rate and Rhythm: Normal rate and regular rhythm.      Heart sounds: Normal heart sounds.   Pulmonary:      Effort: Pulmonary effort is normal.      Breath sounds: Normal breath sounds.   Abdominal:      Palpations: Abdomen is soft.   Musculoskeletal:      Cervical back: Normal range of motion.      Right lower leg: No edema.      Left lower leg: No edema. "   Lymphadenopathy:      Cervical: No cervical adenopathy.   Skin:     General: Skin is warm and dry.   Neurological:      Mental Status: She is alert and oriented to person, place, and time.   Psychiatric:         Attention and Perception: She is attentive.         Mood and Affect: Mood normal.         Speech: Speech normal.         Behavior: Behavior normal.           ECG 12 Lead    Date/Time: 2025 10:47 AM  Performed by: Mamie Gongora MD    Authorized by: Mamie Gongora MD  Comparison: not compared with previous ECG   Previous ECG: no previous ECG available  Rhythm: sinus rhythm  Rate: normal  BPM: 60  Conduction: conduction normal  ST Segments: ST segments normal  T Waves: T waves normal  QRS axis: normal  Other: no other findings    Clinical impression: normal ECG                                                                                               Health  Risk Assessment    Smoking Status:  Social History     Tobacco Use   Smoking Status Never   Smokeless Tobacco Never     Alcohol Consumption:  Social History     Substance and Sexual Activity   Alcohol Use None       Fall Risk Screen  STEADI Fall Risk Assessment was completed, and patient is at LOW risk for falls.Assessment completed on:2025    Depression Screening   Little interest or pleasure in doing things? Not at all   Feeling down, depressed, or hopeless? Not at all   PHQ-2 Total Score 0      Health Habits and Functional and Cognitive Screenin/11/2025    10:24 AM   Functional & Cognitive Status   Do you have difficulty preparing food and eating? No   Do you have difficulty bathing yourself, getting dressed or grooming yourself? No   Do you have difficulty using the toilet? No   Do you have difficulty moving around from place to place? No   Do you have trouble with steps or getting out of a bed or a chair? No   Current Diet Limited Junk Food   Dental Exam Not up to date   Eye Exam Up to date   Exercise (times per week) 0  times per week   Current Exercises Include No Regular Exercise   Do you need help using the phone?  No   Are you deaf or do you have serious difficulty hearing?  No   Do you need help to go to places out of walking distance? No   Do you need help shopping? No   Do you need help preparing meals?  No   Do you need help with housework?  No   Do you need help with laundry? No   Do you need help taking your medications? No   Do you need help managing money? No   Do you ever drive or ride in a car without wearing a seat belt? No   Have you felt unusual fatigue (could be tiredness), stress, anger or loneliness in the last month? No   Who do you live with? Other   If you need help, do you have trouble finding someone available to you? No   Have you been bothered in the last four weeks by sexual problems? No   Do you have difficulty concentrating, remembering or making decisions? No   Visual Acuity:  Vision Screening    Right eye Left eye Both eyes   Without correction 20/50 20/40 20/40   With correction        Age-appropriate Screening Schedule:  Refer to the list below for future screening recommendations based on patient's age, sex and/or medical conditions. Orders for these recommended tests are listed in the plan section. The patient has been provided with a written plan.    Health Maintenance List  Health Maintenance   Topic Date Due    DXA SCAN  Never done    MAMMOGRAM  Never done    COLORECTAL CANCER SCREENING  Never done    HEPATITIS C SCREENING  Never done    COVID-19 Vaccine (1 - 2024-25 season) 12/31/2025 (Originally 9/1/2024)    TDAP/TD VACCINES (1 - Tdap) 12/31/2025 (Originally 5/29/1979)    ZOSTER VACCINE (1 of 2) 12/31/2025 (Originally 5/29/2010)    Pneumococcal Vaccine 50+ (1 of 1 - PCV) 01/07/2026 (Originally 5/29/2010)    INFLUENZA VACCINE  10/01/2025    ANNUAL WELLNESS VISIT  07/11/2026                                                                                                                                                 CMS Preventative Services Quick Reference  Risk Factors Identified During Encounter  Immunizations Discussed/Encouraged: Capvaxive (Pneumococcal conjugate - PCV21)  Inactivity/Sedentary: Patient was advised to exercise at least 150 minutes a week per CDC recommendations.  Dental Screening Recommended  Vision Screening Recommended    The above risks/problems have been discussed with the patient.  Pertinent information has been shared with the patient in the After Visit Summary.  An After Visit Summary and PPPS were made available to the patient.    Follow Up:   Next Medicare Wellness visit to be scheduled in 1 year.     Assessment & Plan  Welcome to Medicare preventive visit    Orders:    Cologuard - Stool, Per Rectum; Future    DEXA Bone Density Axial; Future    Hepatitis C Antibody    Mammo Screening Digital Tomosynthesis Bilateral With CAD; Future    Comprehensive Metabolic Panel    Lipid Panel    Encounter for colorectal cancer screening using Cologuard test    Orders:    Cologuard - Stool, Per Rectum; Future    Postmenopause    Orders:    DEXA Bone Density Axial; Future    Need for hepatitis C screening test    Orders:    Hepatitis C Antibody    Encounter for screening mammogram for malignant neoplasm of breast    Orders:    Mammo Screening Digital Tomosynthesis Bilateral With CAD; Future    Need for pneumococcal vaccine         Screening, ischemic heart disease    Orders:    ECG 12 Lead    Comprehensive Metabolic Panel    Lipid Panel    Screening for diabetes mellitus    Orders:    Comprehensive Metabolic Panel    Lipid Panel         Follow Up:   No follow-ups on file.

## 2025-08-26 ENCOUNTER — TELEPHONE (OUTPATIENT)
Dept: FAMILY MEDICINE CLINIC | Facility: CLINIC | Age: 65
End: 2025-08-26
Payer: MEDICARE

## 2025-08-26 DIAGNOSIS — R92.8 ABNORMAL MAMMOGRAM: Primary | ICD-10-CM

## 2025-08-27 ENCOUNTER — TELEPHONE (OUTPATIENT)
Dept: FAMILY MEDICINE CLINIC | Facility: CLINIC | Age: 65
End: 2025-08-27
Payer: MEDICARE